# Patient Record
Sex: MALE | Race: BLACK OR AFRICAN AMERICAN | NOT HISPANIC OR LATINO | Employment: OTHER | URBAN - METROPOLITAN AREA
[De-identification: names, ages, dates, MRNs, and addresses within clinical notes are randomized per-mention and may not be internally consistent; named-entity substitution may affect disease eponyms.]

---

## 2018-06-01 ENCOUNTER — OFFICE VISIT (OUTPATIENT)
Dept: URGENT CARE | Facility: CLINIC | Age: 48
End: 2018-06-01
Payer: MEDICAID

## 2018-06-01 VITALS
WEIGHT: 260 LBS | TEMPERATURE: 97 F | BODY MASS INDEX: 35.21 KG/M2 | OXYGEN SATURATION: 97 % | HEART RATE: 89 BPM | HEIGHT: 72 IN | DIASTOLIC BLOOD PRESSURE: 96 MMHG | RESPIRATION RATE: 18 BRPM | SYSTOLIC BLOOD PRESSURE: 150 MMHG

## 2018-06-01 DIAGNOSIS — J32.9 SINUSITIS, UNSPECIFIED CHRONICITY, UNSPECIFIED LOCATION: Primary | ICD-10-CM

## 2018-06-01 DIAGNOSIS — R05.9 COUGH: ICD-10-CM

## 2018-06-01 PROCEDURE — 99203 OFFICE O/P NEW LOW 30 MIN: CPT | Mod: 25,S$GLB,, | Performed by: NURSE PRACTITIONER

## 2018-06-01 RX ORDER — AMLODIPINE BESYLATE 10 MG/1
10 TABLET ORAL DAILY
COMMUNITY

## 2018-06-01 RX ORDER — LORATADINE 10 MG/1
10 TABLET ORAL DAILY
COMMUNITY

## 2018-06-01 RX ORDER — BENAZEPRIL HYDROCHLORIDE 40 MG/1
40 TABLET ORAL DAILY
COMMUNITY

## 2018-06-01 RX ORDER — BENZONATATE 200 MG/1
200 CAPSULE ORAL 3 TIMES DAILY PRN
Qty: 30 CAPSULE | Refills: 0 | Status: SHIPPED | OUTPATIENT
Start: 2018-06-01 | End: 2018-06-11

## 2018-06-01 RX ORDER — DEXAMETHASONE SODIUM PHOSPHATE 100 MG/10ML
10 INJECTION INTRAMUSCULAR; INTRAVENOUS ONCE
Status: COMPLETED | OUTPATIENT
Start: 2018-06-01 | End: 2018-06-01

## 2018-06-01 RX ORDER — AZITHROMYCIN 250 MG/1
TABLET, FILM COATED ORAL
Qty: 6 TABLET | Refills: 0 | Status: SHIPPED | OUTPATIENT
Start: 2018-06-01 | End: 2023-07-06

## 2018-06-01 RX ORDER — FLUTICASONE PROPIONATE 50 MCG
1 SPRAY, SUSPENSION (ML) NASAL DAILY
COMMUNITY

## 2018-06-01 RX ORDER — PROMETHAZINE HYDROCHLORIDE AND DEXTROMETHORPHAN HYDROBROMIDE 6.25; 15 MG/5ML; MG/5ML
5 SYRUP ORAL NIGHTLY PRN
Qty: 50 ML | Refills: 0 | Status: SHIPPED | OUTPATIENT
Start: 2018-06-01 | End: 2018-06-11

## 2018-06-01 RX ADMIN — DEXAMETHASONE SODIUM PHOSPHATE 10 MG: 100 INJECTION INTRAMUSCULAR; INTRAVENOUS at 10:06

## 2018-06-01 NOTE — PROGRESS NOTES
Subjective:       Patient ID: Dharmesh Richter is a 47 y.o. male.    Vitals:  height is 6' (1.829 m) and weight is 117.9 kg (260 lb). His temperature is 97.3 °F (36.3 °C). His blood pressure is 150/96 (abnormal) and his pulse is 89. His respiration is 18 and oxygen saturation is 97%.     Chief Complaint: Sore Throat    Patient presents with complaints of worsening sinus symptoms x 3 weeks. Patient reports chills; however, denies fevers, chest pains, SOB, headaches, or recent illnesses. Patient reports having a sinus balloon surgery over year and half ago. Patient reports taking Claritin and Flonase daily since his surgery. Patient reports taking his daily medications which provided no relief.      Sore Throat    This is a new problem. The current episode started 1 to 4 weeks ago (3 weeks ago). The problem has been gradually worsening. There has been no fever. The pain is at a severity of 8/10. The pain is moderate. Associated symptoms include coughing, ear pain, headaches, a hoarse voice and a plugged ear sensation. Pertinent negatives include no abdominal pain, congestion, ear discharge, shortness of breath, swollen glands or trouble swallowing. He has had no exposure to strep or mono. He has tried nothing for the symptoms.     Review of Systems   Constitution: Positive for malaise/fatigue. Negative for chills and fever.   HENT: Positive for ear pain, hoarse voice and sore throat. Negative for congestion, ear discharge and trouble swallowing.    Eyes: Negative for discharge and redness.   Cardiovascular: Negative for chest pain, dyspnea on exertion and leg swelling.   Respiratory: Positive for cough. Negative for shortness of breath, sputum production and wheezing.    Musculoskeletal: Negative for myalgias.   Gastrointestinal: Negative for abdominal pain and nausea.   Neurological: Positive for headaches.       Objective:      Physical Exam   Constitutional: He is oriented to person, place, and time. He appears  well-developed and well-nourished. He is cooperative.  Non-toxic appearance. He does not appear ill. No distress.   HENT:   Head: Normocephalic and atraumatic.   Right Ear: Hearing, external ear and ear canal normal. No drainage or tenderness. Tympanic membrane is not erythematous. A middle ear effusion (clear) is present.   Left Ear: Hearing, external ear and ear canal normal. No drainage or tenderness. Tympanic membrane is not erythematous. A middle ear effusion (clear) is present.   Nose: Mucosal edema and rhinorrhea present. No nasal deformity. No epistaxis. Right sinus exhibits no maxillary sinus tenderness and no frontal sinus tenderness. Left sinus exhibits no maxillary sinus tenderness and no frontal sinus tenderness.   Mouth/Throat: Uvula is midline and mucous membranes are normal. No trismus in the jaw. Normal dentition. No uvula swelling. Posterior oropharyngeal erythema present. No oropharyngeal exudate, posterior oropharyngeal edema or tonsillar abscesses.   Eyes: Lids are normal. Right eye exhibits no discharge. Left eye exhibits no discharge. Right conjunctiva is injected. Left conjunctiva is injected. No scleral icterus.   Sclera clear bilat   Neck: Trachea normal, normal range of motion, full passive range of motion without pain and phonation normal. Neck supple.   Cardiovascular: Normal rate, regular rhythm, normal heart sounds, intact distal pulses and normal pulses.    Pulmonary/Chest: Effort normal and breath sounds normal. No respiratory distress.   Cough.   Abdominal: Soft. Normal appearance and bowel sounds are normal. He exhibits no distension, no pulsatile midline mass and no mass. There is no tenderness.   Musculoskeletal: Normal range of motion. He exhibits no edema or deformity.   Lymphadenopathy:     He has no cervical adenopathy.   Neurological: He is alert and oriented to person, place, and time. He exhibits normal muscle tone. Coordination normal.   Skin: Skin is warm, dry and  intact. He is not diaphoretic. No pallor.   Psychiatric: He has a normal mood and affect. His speech is normal and behavior is normal. Judgment and thought content normal. Cognition and memory are normal.   Nursing note and vitals reviewed.      Assessment:       1. Sinusitis, unspecified chronicity, unspecified location    2. Cough        Plan:         Sinusitis, unspecified chronicity, unspecified location  -     dexamethasone injection 10 mg; Inject 1 mL (10 mg total) into the muscle once.  -     azithromycin (Z-MICH) 250 MG tablet; Take 2 tablets by mouth on day 1; Take 1 tablet by mouth on days 2-5  Dispense: 6 tablet; Refill: 0  -     benzonatate (TESSALON) 200 MG capsule; Take 1 capsule (200 mg total) by mouth 3 (three) times daily as needed.  Dispense: 30 capsule; Refill: 0    Cough  -     promethazine-dextromethorphan (PROMETHAZINE-DM) 6.25-15 mg/5 mL Syrp; Take 5 mLs by mouth nightly as needed.  Dispense: 50 mL; Refill: 0      Patient Instructions     Use an antihistmine such as Claritin, Zyrtec or Allegra to dry you out.     Use pseudoephedrine (behind the counter) to decongest. Pseudoephedrine  30 mg up to 240 mg /day. It can raise your blood pressure and give you palpitations.    Use mucinex (guaifenisin) to break up mucous up to 2400mg/day to loosen any mucous. The mucinex DM pill has a cough suppressant that can be used at night to stop the tickle at the back of your throat.    Use Nasal Saline to mechanically move any post nasal drip from your eustachian tube or from the back of your throat.    Use Afrin in each nare for no longer than 3 days, as it is addictive. It can also dry out your mucous membranes and cause elevated blood pressure.    Use Flonase 1-2 sprays/nostril per day. It is a local acting steroid nasal spray, if you develop a bloody nose, stop using the medication immediately.    Use warm salt water gargles to ease your throat pain. Warm salt water gargles as needed for sore throat-  1/2  tsp salt to 1 cup warm water, gargle as desired.    Sometimes Nyquil at night is beneficial to help you get some rest, however it is sedating and it does have an antihistamine, and tylenol.  Follow-up with PCP, ENT, or return to Urgent Care for worsening of symptoms.  Sinusitis (Antibiotic Treatment)    The sinuses are air-filled spaces within the bones of the face. They connect to the inside of the nose. Sinusitis is an inflammation of the tissue lining the sinus cavity. Sinus inflammation can occur during a cold. It can also be due to allergies to pollens and other particles in the air. Sinusitis can cause symptoms of sinus congestion and fullness. A sinus infection causes fever, headache and facial pain. There is often green or yellow drainage from the nose or into the back of the throat (post-nasal drip). You have been given antibiotics to treat this condition.  Home care:  · Take the full course of antibiotics as instructed. Do not stop taking them, even if you feel better.  · Drink plenty of water, hot tea, and other liquids. This may help thin mucus. It also may promote sinus drainage.  · Heat may help soothe painful areas of the face. Use a towel soaked in hot water. Or,  the shower and direct the hot spray onto your face. Using a vaporizer along with a menthol rub at night may also help.   · An expectorant containing guaifenesin may help thin the mucus and promote drainage from the sinuses.  · Over-the-counter decongestants may be used unless a similar medicine was prescribed. Nasal sprays work the fastest. Use one that contains phenylephrine or oxymetazoline. First blow the nose gently. Then use the spray. Do not use these medicines more often than directed on the label or symptoms may get worse. You may also use tablets containing pseudoephedrine. Avoid products that combine ingredients, because side effects may be increased. Read labels. You can also ask the pharmacist for help. (NOTE: Persons  with high blood pressure should not use decongestants. They can raise blood pressure.)  · Over-the-counter antihistamines may help if allergies contributed to your sinusitis.    · Do not use nasal rinses or irrigation during an acute sinus infection, unless told to by your health care provider. Rinsing may spread the infection to other sinuses.  · Use acetaminophen or ibuprofen to control pain, unless another pain medicine was prescribed. (If you have chronic liver or kidney disease or ever had a stomach ulcer, talk with your doctor before using these medicines. Aspirin should never be used in anyone under 18 years of age who is ill with a fever. It may cause severe liver damage.)  · Don't smoke. This can worsen symptoms.  Follow-up care  Follow up with your healthcare provider or our staff if you are not improving within the next week.  When to seek medical advice  Call your healthcare provider if any of these occur:  · Facial pain or headache becoming more severe  · Stiff neck  · Unusual drowsiness or confusion  · Swelling of the forehead or eyelids  · Vision problems, including blurred or double vision  · Fever of 100.4ºF (38ºC) or higher, or as directed by your healthcare provider  · Seizure  · Breathing problems  · Symptoms not resolving within 10 days  Date Last Reviewed: 4/13/2015  © 4412-6071 [employer]. [address] [place] [address], [place] [address]. All rights reserved. This information is not intended as a substitute for professional medical care. Always follow your healthcare professional's instructions.      Please return here or go to the Emergency Department for any concerns or worsening of condition.  If you were prescribed antibiotics, please take them to completion.  If you were prescribed a narcotic medication, do not drive or operate heavy equipment or machinery while taking these medications.  Please follow up with your primary care doctor or specialist as needed.    If you  smoke,  please stop smoking.

## 2018-06-01 NOTE — PATIENT INSTRUCTIONS
Use an antihistmine such as Claritin, Zyrtec or Allegra to dry you out.     Use pseudoephedrine (behind the counter) to decongest. Pseudoephedrine  30 mg up to 240 mg /day. It can raise your blood pressure and give you palpitations.    Use mucinex (guaifenisin) to break up mucous up to 2400mg/day to loosen any mucous. The mucinex DM pill has a cough suppressant that can be used at night to stop the tickle at the back of your throat.    Use Nasal Saline to mechanically move any post nasal drip from your eustachian tube or from the back of your throat.    Use Afrin in each nare for no longer than 3 days, as it is addictive. It can also dry out your mucous membranes and cause elevated blood pressure.    Use Flonase 1-2 sprays/nostril per day. It is a local acting steroid nasal spray, if you develop a bloody nose, stop using the medication immediately.    Use warm salt water gargles to ease your throat pain. Warm salt water gargles as needed for sore throat-  1/2 tsp salt to 1 cup warm water, gargle as desired.    Sometimes Nyquil at night is beneficial to help you get some rest, however it is sedating and it does have an antihistamine, and tylenol.  Follow-up with PCP, ENT, or return to Urgent Care for worsening of symptoms.  Sinusitis (Antibiotic Treatment)    The sinuses are air-filled spaces within the bones of the face. They connect to the inside of the nose. Sinusitis is an inflammation of the tissue lining the sinus cavity. Sinus inflammation can occur during a cold. It can also be due to allergies to pollens and other particles in the air. Sinusitis can cause symptoms of sinus congestion and fullness. A sinus infection causes fever, headache and facial pain. There is often green or yellow drainage from the nose or into the back of the throat (post-nasal drip). You have been given antibiotics to treat this condition.  Home care:  · Take the full course of antibiotics as instructed. Do not stop taking them, even  if you feel better.  · Drink plenty of water, hot tea, and other liquids. This may help thin mucus. It also may promote sinus drainage.  · Heat may help soothe painful areas of the face. Use a towel soaked in hot water. Or,  the shower and direct the hot spray onto your face. Using a vaporizer along with a menthol rub at night may also help.   · An expectorant containing guaifenesin may help thin the mucus and promote drainage from the sinuses.  · Over-the-counter decongestants may be used unless a similar medicine was prescribed. Nasal sprays work the fastest. Use one that contains phenylephrine or oxymetazoline. First blow the nose gently. Then use the spray. Do not use these medicines more often than directed on the label or symptoms may get worse. You may also use tablets containing pseudoephedrine. Avoid products that combine ingredients, because side effects may be increased. Read labels. You can also ask the pharmacist for help. (NOTE: Persons with high blood pressure should not use decongestants. They can raise blood pressure.)  · Over-the-counter antihistamines may help if allergies contributed to your sinusitis.    · Do not use nasal rinses or irrigation during an acute sinus infection, unless told to by your health care provider. Rinsing may spread the infection to other sinuses.  · Use acetaminophen or ibuprofen to control pain, unless another pain medicine was prescribed. (If you have chronic liver or kidney disease or ever had a stomach ulcer, talk with your doctor before using these medicines. Aspirin should never be used in anyone under 18 years of age who is ill with a fever. It may cause severe liver damage.)  · Don't smoke. This can worsen symptoms.  Follow-up care  Follow up with your healthcare provider or our staff if you are not improving within the next week.  When to seek medical advice  Call your healthcare provider if any of these occur:  · Facial pain or headache becoming more  severe  · Stiff neck  · Unusual drowsiness or confusion  · Swelling of the forehead or eyelids  · Vision problems, including blurred or double vision  · Fever of 100.4ºF (38ºC) or higher, or as directed by your healthcare provider  · Seizure  · Breathing problems  · Symptoms not resolving within 10 days  Date Last Reviewed: 4/13/2015  © 1639-5029 [employer]. [address] [place] [address], [place] [address]. All rights reserved. This information is not intended as a substitute for professional medical care. Always follow your healthcare professional's instructions.      Please return here or go to the Emergency Department for any concerns or worsening of condition.  If you were prescribed antibiotics, please take them to completion.  If you were prescribed a narcotic medication, do not drive or operate heavy equipment or machinery while taking these medications.  Please follow up with your primary care doctor or specialist as needed.    If you  smoke, please stop smoking.

## 2018-06-04 ENCOUNTER — TELEPHONE (OUTPATIENT)
Dept: URGENT CARE | Facility: CLINIC | Age: 48
End: 2018-06-04

## 2021-08-21 ENCOUNTER — CLINICAL SUPPORT (OUTPATIENT)
Dept: URGENT CARE | Facility: CLINIC | Age: 51
End: 2021-08-21
Payer: MEDICAID

## 2021-08-21 DIAGNOSIS — Z20.822 CLOSE EXPOSURE TO COVID-19 VIRUS: Primary | ICD-10-CM

## 2021-08-21 LAB
CTP QC/QA: YES
SARS-COV-2 RDRP RESP QL NAA+PROBE: NEGATIVE

## 2021-08-21 PROCEDURE — U0002 COVID-19 LAB TEST NON-CDC: HCPCS | Mod: QW,S$GLB,, | Performed by: INTERNAL MEDICINE

## 2021-08-21 PROCEDURE — U0002: ICD-10-PCS | Mod: QW,S$GLB,, | Performed by: INTERNAL MEDICINE

## 2022-09-20 ENCOUNTER — OFFICE VISIT (OUTPATIENT)
Dept: URGENT CARE | Facility: CLINIC | Age: 52
End: 2022-09-20
Payer: MEDICAID

## 2022-09-20 VITALS
OXYGEN SATURATION: 95 % | HEIGHT: 72 IN | BODY MASS INDEX: 35.21 KG/M2 | DIASTOLIC BLOOD PRESSURE: 107 MMHG | WEIGHT: 260 LBS | TEMPERATURE: 98 F | HEART RATE: 65 BPM | SYSTOLIC BLOOD PRESSURE: 163 MMHG | RESPIRATION RATE: 18 BRPM

## 2022-09-20 DIAGNOSIS — L03.115 CELLULITIS OF RIGHT FOOT: Primary | ICD-10-CM

## 2022-09-20 PROCEDURE — 3077F PR MOST RECENT SYSTOLIC BLOOD PRESSURE >= 140 MM HG: ICD-10-PCS | Mod: CPTII,S$GLB,, | Performed by: PHYSICIAN ASSISTANT

## 2022-09-20 PROCEDURE — 4010F PR ACE/ARB THEARPY RXD/TAKEN: ICD-10-PCS | Mod: CPTII,S$GLB,, | Performed by: PHYSICIAN ASSISTANT

## 2022-09-20 PROCEDURE — 4010F ACE/ARB THERAPY RXD/TAKEN: CPT | Mod: CPTII,S$GLB,, | Performed by: PHYSICIAN ASSISTANT

## 2022-09-20 PROCEDURE — 1160F PR REVIEW ALL MEDS BY PRESCRIBER/CLIN PHARMACIST DOCUMENTED: ICD-10-PCS | Mod: CPTII,S$GLB,, | Performed by: PHYSICIAN ASSISTANT

## 2022-09-20 PROCEDURE — 3080F PR MOST RECENT DIASTOLIC BLOOD PRESSURE >= 90 MM HG: ICD-10-PCS | Mod: CPTII,S$GLB,, | Performed by: PHYSICIAN ASSISTANT

## 2022-09-20 PROCEDURE — 99203 OFFICE O/P NEW LOW 30 MIN: CPT | Mod: S$GLB,,, | Performed by: PHYSICIAN ASSISTANT

## 2022-09-20 PROCEDURE — 1159F PR MEDICATION LIST DOCUMENTED IN MEDICAL RECORD: ICD-10-PCS | Mod: CPTII,S$GLB,, | Performed by: PHYSICIAN ASSISTANT

## 2022-09-20 PROCEDURE — 1159F MED LIST DOCD IN RCRD: CPT | Mod: CPTII,S$GLB,, | Performed by: PHYSICIAN ASSISTANT

## 2022-09-20 PROCEDURE — 3008F PR BODY MASS INDEX (BMI) DOCUMENTED: ICD-10-PCS | Mod: CPTII,S$GLB,, | Performed by: PHYSICIAN ASSISTANT

## 2022-09-20 PROCEDURE — 3008F BODY MASS INDEX DOCD: CPT | Mod: CPTII,S$GLB,, | Performed by: PHYSICIAN ASSISTANT

## 2022-09-20 PROCEDURE — 3080F DIAST BP >= 90 MM HG: CPT | Mod: CPTII,S$GLB,, | Performed by: PHYSICIAN ASSISTANT

## 2022-09-20 PROCEDURE — 3077F SYST BP >= 140 MM HG: CPT | Mod: CPTII,S$GLB,, | Performed by: PHYSICIAN ASSISTANT

## 2022-09-20 PROCEDURE — 1160F RVW MEDS BY RX/DR IN RCRD: CPT | Mod: CPTII,S$GLB,, | Performed by: PHYSICIAN ASSISTANT

## 2022-09-20 PROCEDURE — 99203 PR OFFICE/OUTPT VISIT, NEW, LEVL III, 30-44 MIN: ICD-10-PCS | Mod: S$GLB,,, | Performed by: PHYSICIAN ASSISTANT

## 2022-09-20 RX ORDER — CLINDAMYCIN HYDROCHLORIDE 300 MG/1
300 CAPSULE ORAL EVERY 6 HOURS
Qty: 28 CAPSULE | Refills: 0 | Status: SHIPPED | OUTPATIENT
Start: 2022-09-20 | End: 2022-09-27

## 2022-09-20 RX ORDER — KETOROLAC TROMETHAMINE 30 MG/ML
30 INJECTION, SOLUTION INTRAMUSCULAR; INTRAVENOUS
Status: COMPLETED | OUTPATIENT
Start: 2022-09-20 | End: 2022-09-20

## 2022-09-20 RX ORDER — MUPIROCIN 20 MG/G
OINTMENT TOPICAL 2 TIMES DAILY
Qty: 15 G | Refills: 0 | Status: SHIPPED | OUTPATIENT
Start: 2022-09-20

## 2022-09-20 RX ADMIN — KETOROLAC TROMETHAMINE 30 MG: 30 INJECTION, SOLUTION INTRAMUSCULAR; INTRAVENOUS at 06:09

## 2022-09-20 NOTE — PROGRESS NOTES
Subjective:       Patient ID: Dharmesh Richter is a 52 y.o. male.    Vitals:  height is 6' (1.829 m) and weight is 117.9 kg (260 lb). His oral temperature is 98.3 °F (36.8 °C). His blood pressure is 163/107 (abnormal) and his pulse is 65. His respiration is 18 and oxygen saturation is 95%.     Chief Complaint: Foot Injury    Mr. Richter presents for evaluation of right foot swelling, redness, pain that started this morning.  He reports pain that is worse with weight-bearing.  The foot is hot and red.  He denies any fevers or chills.  He denies any drainage from the rash.  He reports this exact same thing happened about 10 or 15 years ago and he had cellulitis that was treated with antibiotics and it resolved.  He has not tried anything for his symptoms.    Foot Injury   The incident occurred 12 to 24 hours ago. There was no injury mechanism. The pain is present in the left foot. The pain is at a severity of 10/10. The pain is severe. The pain has been Constant since onset. He reports no foreign bodies present. He has tried nothing for the symptoms.     Constitution: Negative for chills, sweating, fatigue and fever.   HENT:  Negative for ear pain, ear discharge, congestion, postnasal drip, sinus pain, sinus pressure and sore throat.    Neck: Negative for neck pain.   Cardiovascular:  Negative for chest trauma, chest pain, leg swelling, palpitations, sob on exertion and passing out.   Eyes:  Negative for blurred vision.   Respiratory:  Negative for cough and shortness of breath.    Gastrointestinal:  Negative for abdominal pain, nausea, vomiting and diarrhea.   Genitourinary:  Negative for dysuria, frequency and urgency.   Musculoskeletal:  Positive for pain and pain with walking. Negative for trauma.   Skin:  Positive for erythema. Negative for rash and hives.   Allergic/Immunologic: Negative for hives and itching.   Neurological:  Negative for dizziness, history of vertigo, light-headedness, passing out, facial  drooping, speech difficulty, coordination disturbances, loss of balance, headaches and altered mental status.   Hematologic/Lymphatic: Negative for easy bruising/bleeding. Does not bruise/bleed easily.   Psychiatric/Behavioral:  Negative for altered mental status.      Objective:      Physical Exam   Constitutional: He is oriented to person, place, and time. He appears well-developed.   HENT:   Head: Normocephalic and atraumatic. Head is without abrasion, without contusion and without laceration.   Ears:   Right Ear: External ear normal.   Left Ear: External ear normal.   Nose: Nose normal.   Mouth/Throat: Oropharynx is clear and moist and mucous membranes are normal.   Eyes: Conjunctivae, EOM and lids are normal. Pupils are equal, round, and reactive to light.   Neck: Trachea normal and phonation normal. Neck supple.   Cardiovascular: Normal rate, regular rhythm and normal heart sounds.   Pulmonary/Chest: Effort normal and breath sounds normal. No stridor. No respiratory distress.   Musculoskeletal: Normal range of motion.         General: Normal range of motion.        Feet:    Neurological: He is alert and oriented to person, place, and time.   Skin: Skin is warm, dry, intact and no rash. Capillary refill takes less than 2 seconds. erythema No abrasion, No burn, No bruising and No ecchymosis   Psychiatric: His speech is normal and behavior is normal. Judgment and thought content normal.   Nursing note and vitals reviewed.              Assessment:       1. Cellulitis of right foot          Plan:         Cellulitis of right foot    Other orders  -     mupirocin (BACTROBAN) 2 % ointment; Apply topically 2 (two) times daily.  Dispense: 15 g; Refill: 0  -     clindamycin (CLEOCIN) 300 MG capsule; Take 1 capsule (300 mg total) by mouth every 6 (six) hours. for 7 days  Dispense: 28 capsule; Refill: 0  -     ketorolac injection 30 mg    Diagnoses and plan discussed with the patient, as well as the expected course and  duration of his symptoms.  All questions and concerns were addressed prior to discharge.  He was advised to follow up with his PCP within 1 week if symptoms do not improve.  Emergency department precautions were given.  Patient verbalized understanding and was happy with the plan of care.   Note dictated with voice recognition software, please excuse any grammatical errors.  I note the patient has elevated blood pressures during this encounter. Patient does not have signs or symptoms suggestive of hypertensive emergency (denies chest pain, shortness breath, vision change, or urinary changes consistent with acute hypertensive kidney disease). Risk of acutely lowering blood pressure exceeds benefit. I have asked the patient follow up with PCP for continued hypertension management.    Patient Instructions   PLEASE READ YOUR DISCHARGE INSTRUCTIONS ENTIRELY AS IT CONTAINS IMPORTANT INFORMATION.  You received an injection of a powerful NSAID today (Toradol).  Its effects will last up to 24 hours.  Please do not take another NSAID (ie aspirin, ibuprofen, Aleve, Advil or Motrin) until this time tomorrow.  If you continue to have pain, you may take Tylenol (acetaminophen) if you are not allergic to this medication.  Please take care 1st dose of antibiotics tonight.  - Rest.    - Drink plenty of fluids.    - Tylenol or Ibuprofen as directed as needed for fever/pain.    - If you were prescribed antibiotics, please take them to completion.  - If you are female and on birth control pills - please use additional methods of contraception to prevent pregnancy while on antibiotics and for one cycle after.   - If you were prescribed a narcotic medication, a cough syrup, or a muscle relaxer, do not drive or operate heavy equipment or machinery while taking these medications, as they can cause drowsiness.   - If a referral to a specialty was made today, you should receive a phone call in the next few days to schedule an appt.  Please  call [phone] to schedule an appt if have not gotten a phone call in the next few days.  - If you smoke, please stop smoking.  -You must understand that you've received an Urgent Care treatment only and that you may be released before all your medical problems are known or treated. You, the patient, will arrange for follow up care as instructed. Please arrange follow up with your primary medical clinic as soon as possible.   - Follow up with your PCP or specialty clinic as directed in the next 1-2 weeks if not improved or as needed.  You can call [phone] to schedule an appointment with the appropriate provider.    - Please return to Urgent Care or to the Emergency Department if your symptoms worsen.    Patient aware and verbalized understanding.

## 2022-09-20 NOTE — LETTER
September 20, 2022      [facility]  [address] [address]  Phone: [phone]  Fax: [phone]       Patient: Dharmesh Richter   YOB: 1970  Date of Visit: 09/20/2022    To Whom It May Concern:    Wild Richter  was at [facility] on 09/20/2022. The patient may return to work/school on 9/24/2022 with no restrictions. If you have any questions or concerns, or if I can be of further assistance, please do not hesitate to contact me.    Sincerely,    Natalia Horner PA-C

## 2022-09-20 NOTE — PATIENT INSTRUCTIONS
PLEASE READ YOUR DISCHARGE INSTRUCTIONS ENTIRELY AS IT CONTAINS IMPORTANT INFORMATION.  You received an injection of a powerful NSAID today (Toradol).  Its effects will last up to 24 hours.  Please do not take another NSAID (ie aspirin, ibuprofen, Aleve, Advil or Motrin) until this time tomorrow.  If you continue to have pain, you may take Tylenol (acetaminophen) if you are not allergic to this medication.  Please take care 1st dose of antibiotics tonight.  - Rest.    - Drink plenty of fluids.    - Tylenol or Ibuprofen as directed as needed for fever/pain.    - If you were prescribed antibiotics, please take them to completion.  - If you are female and on birth control pills - please use additional methods of contraception to prevent pregnancy while on antibiotics and for one cycle after.   - If you were prescribed a narcotic medication, a cough syrup, or a muscle relaxer, do not drive or operate heavy equipment or machinery while taking these medications, as they can cause drowsiness.   - If a referral to a specialty was made today, you should receive a phone call in the next few days to schedule an appt.  Please call [phone] to schedule an appt if have not gotten a phone call in the next few days.  - If you smoke, please stop smoking.  -You must understand that you've received an Urgent Care treatment only and that you may be released before all your medical problems are known or treated. You, the patient, will arrange for follow up care as instructed. Please arrange follow up with your primary medical clinic as soon as possible.   - Follow up with your PCP or specialty clinic as directed in the next 1-2 weeks if not improved or as needed.  You can call [phone] to schedule an appointment with the appropriate provider.    - Please return to Urgent Care or to the Emergency Department if your symptoms worsen.    Patient aware and verbalized understanding.

## 2023-07-06 ENCOUNTER — LAB VISIT (OUTPATIENT)
Dept: LAB | Facility: HOSPITAL | Age: 53
End: 2023-07-06
Attending: NURSE PRACTITIONER
Payer: COMMERCIAL

## 2023-07-06 ENCOUNTER — OFFICE VISIT (OUTPATIENT)
Dept: PRIMARY CARE CLINIC | Facility: CLINIC | Age: 53
End: 2023-07-06
Payer: COMMERCIAL

## 2023-07-06 VITALS
OXYGEN SATURATION: 95 % | SYSTOLIC BLOOD PRESSURE: 124 MMHG | BODY MASS INDEX: 37.42 KG/M2 | WEIGHT: 276.25 LBS | HEART RATE: 68 BPM | HEIGHT: 72 IN | DIASTOLIC BLOOD PRESSURE: 78 MMHG

## 2023-07-06 DIAGNOSIS — Z00.00 ROUTINE MEDICAL EXAM: ICD-10-CM

## 2023-07-06 DIAGNOSIS — Z12.5 PROSTATE CANCER SCREENING: ICD-10-CM

## 2023-07-06 DIAGNOSIS — Z00.00 ROUTINE MEDICAL EXAM: Primary | ICD-10-CM

## 2023-07-06 DIAGNOSIS — R53.83 FATIGUE, UNSPECIFIED TYPE: Primary | ICD-10-CM

## 2023-07-06 LAB
ALBUMIN SERPL BCP-MCNC: 3.9 G/DL (ref 3.5–5.2)
ALP SERPL-CCNC: 81 U/L (ref 55–135)
ALT SERPL W/O P-5'-P-CCNC: 32 U/L (ref 10–44)
ANION GAP SERPL CALC-SCNC: 11 MMOL/L (ref 8–16)
AST SERPL-CCNC: 21 U/L (ref 10–40)
BASOPHILS # BLD AUTO: 0.04 K/UL (ref 0–0.2)
BASOPHILS NFR BLD: 0.5 % (ref 0–1.9)
BILIRUB SERPL-MCNC: 0.3 MG/DL (ref 0.1–1)
BUN SERPL-MCNC: 12 MG/DL (ref 6–20)
CALCIUM SERPL-MCNC: 9.5 MG/DL (ref 8.7–10.5)
CHLORIDE SERPL-SCNC: 104 MMOL/L (ref 95–110)
CHOLEST SERPL-MCNC: 234 MG/DL (ref 120–199)
CHOLEST/HDLC SERPL: 4.3 {RATIO} (ref 2–5)
CO2 SERPL-SCNC: 23 MMOL/L (ref 23–29)
COMPLEXED PSA SERPL-MCNC: 0.44 NG/ML (ref 0–4)
CREAT SERPL-MCNC: 0.8 MG/DL (ref 0.5–1.4)
DIFFERENTIAL METHOD: ABNORMAL
EOSINOPHIL # BLD AUTO: 0.1 K/UL (ref 0–0.5)
EOSINOPHIL NFR BLD: 1.4 % (ref 0–8)
ERYTHROCYTE [DISTWIDTH] IN BLOOD BY AUTOMATED COUNT: 13.1 % (ref 11.5–14.5)
EST. GFR  (NO RACE VARIABLE): >60 ML/MIN/1.73 M^2
ESTIMATED AVG GLUCOSE: 105 MG/DL (ref 68–131)
GLUCOSE SERPL-MCNC: 84 MG/DL (ref 70–110)
HBA1C MFR BLD: 5.3 % (ref 4–5.6)
HCT VFR BLD AUTO: 46.6 % (ref 40–54)
HDLC SERPL-MCNC: 54 MG/DL (ref 40–75)
HDLC SERPL: 23.1 % (ref 20–50)
HGB BLD-MCNC: 15.7 G/DL (ref 14–18)
IMM GRANULOCYTES # BLD AUTO: 0.03 K/UL (ref 0–0.04)
IMM GRANULOCYTES NFR BLD AUTO: 0.4 % (ref 0–0.5)
LDLC SERPL CALC-MCNC: 155.4 MG/DL (ref 63–159)
LYMPHOCYTES # BLD AUTO: 1.5 K/UL (ref 1–4.8)
LYMPHOCYTES NFR BLD: 18 % (ref 18–48)
MCH RBC QN AUTO: 31.5 PG (ref 27–31)
MCHC RBC AUTO-ENTMCNC: 33.7 G/DL (ref 32–36)
MCV RBC AUTO: 94 FL (ref 82–98)
MONOCYTES # BLD AUTO: 0.5 K/UL (ref 0.3–1)
MONOCYTES NFR BLD: 5.8 % (ref 4–15)
NEUTROPHILS # BLD AUTO: 6.3 K/UL (ref 1.8–7.7)
NEUTROPHILS NFR BLD: 73.9 % (ref 38–73)
NONHDLC SERPL-MCNC: 180 MG/DL
NRBC BLD-RTO: 0 /100 WBC
PLATELET # BLD AUTO: 283 K/UL (ref 150–450)
PMV BLD AUTO: 10.7 FL (ref 9.2–12.9)
POTASSIUM SERPL-SCNC: 4.1 MMOL/L (ref 3.5–5.1)
PROT SERPL-MCNC: 7.6 G/DL (ref 6–8.4)
RBC # BLD AUTO: 4.98 M/UL (ref 4.6–6.2)
SODIUM SERPL-SCNC: 138 MMOL/L (ref 136–145)
T4 FREE SERPL-MCNC: 0.86 NG/DL (ref 0.71–1.51)
TRIGL SERPL-MCNC: 123 MG/DL (ref 30–150)
TSH SERPL DL<=0.005 MIU/L-ACNC: 1.32 UIU/ML (ref 0.4–4)
WBC # BLD AUTO: 8.48 K/UL (ref 3.9–12.7)

## 2023-07-06 PROCEDURE — 3078F PR MOST RECENT DIASTOLIC BLOOD PRESSURE < 80 MM HG: ICD-10-PCS | Mod: CPTII,S$GLB,, | Performed by: NURSE PRACTITIONER

## 2023-07-06 PROCEDURE — 3074F SYST BP LT 130 MM HG: CPT | Mod: CPTII,S$GLB,, | Performed by: NURSE PRACTITIONER

## 2023-07-06 PROCEDURE — 4010F ACE/ARB THERAPY RXD/TAKEN: CPT | Mod: CPTII,S$GLB,, | Performed by: NURSE PRACTITIONER

## 2023-07-06 PROCEDURE — 99396 PREV VISIT EST AGE 40-64: CPT | Mod: S$GLB,,, | Performed by: NURSE PRACTITIONER

## 2023-07-06 PROCEDURE — 1159F PR MEDICATION LIST DOCUMENTED IN MEDICAL RECORD: ICD-10-PCS | Mod: CPTII,S$GLB,, | Performed by: NURSE PRACTITIONER

## 2023-07-06 PROCEDURE — 3074F PR MOST RECENT SYSTOLIC BLOOD PRESSURE < 130 MM HG: ICD-10-PCS | Mod: CPTII,S$GLB,, | Performed by: NURSE PRACTITIONER

## 2023-07-06 PROCEDURE — 3008F PR BODY MASS INDEX (BMI) DOCUMENTED: ICD-10-PCS | Mod: CPTII,S$GLB,, | Performed by: NURSE PRACTITIONER

## 2023-07-06 PROCEDURE — 36415 COLL VENOUS BLD VENIPUNCTURE: CPT | Mod: PN | Performed by: NURSE PRACTITIONER

## 2023-07-06 PROCEDURE — 1159F MED LIST DOCD IN RCRD: CPT | Mod: CPTII,S$GLB,, | Performed by: NURSE PRACTITIONER

## 2023-07-06 PROCEDURE — 1160F RVW MEDS BY RX/DR IN RCRD: CPT | Mod: CPTII,S$GLB,, | Performed by: NURSE PRACTITIONER

## 2023-07-06 PROCEDURE — 83036 HEMOGLOBIN GLYCOSYLATED A1C: CPT | Performed by: NURSE PRACTITIONER

## 2023-07-06 PROCEDURE — 3008F BODY MASS INDEX DOCD: CPT | Mod: CPTII,S$GLB,, | Performed by: NURSE PRACTITIONER

## 2023-07-06 PROCEDURE — 3078F DIAST BP <80 MM HG: CPT | Mod: CPTII,S$GLB,, | Performed by: NURSE PRACTITIONER

## 2023-07-06 PROCEDURE — 84153 ASSAY OF PSA TOTAL: CPT | Performed by: NURSE PRACTITIONER

## 2023-07-06 PROCEDURE — 80061 LIPID PANEL: CPT | Performed by: NURSE PRACTITIONER

## 2023-07-06 PROCEDURE — 99999 PR PBB SHADOW E&M-EST. PATIENT-LVL IV: CPT | Mod: PBBFAC,,, | Performed by: NURSE PRACTITIONER

## 2023-07-06 PROCEDURE — 99396 PR PREVENTIVE VISIT,EST,40-64: ICD-10-PCS | Mod: S$GLB,,, | Performed by: NURSE PRACTITIONER

## 2023-07-06 PROCEDURE — 99999 PR PBB SHADOW E&M-EST. PATIENT-LVL IV: ICD-10-PCS | Mod: PBBFAC,,, | Performed by: NURSE PRACTITIONER

## 2023-07-06 PROCEDURE — 80053 COMPREHEN METABOLIC PANEL: CPT | Performed by: NURSE PRACTITIONER

## 2023-07-06 PROCEDURE — 85025 COMPLETE CBC W/AUTO DIFF WBC: CPT | Performed by: NURSE PRACTITIONER

## 2023-07-06 PROCEDURE — 84439 ASSAY OF FREE THYROXINE: CPT | Performed by: NURSE PRACTITIONER

## 2023-07-06 PROCEDURE — 1160F PR REVIEW ALL MEDS BY PRESCRIBER/CLIN PHARMACIST DOCUMENTED: ICD-10-PCS | Mod: CPTII,S$GLB,, | Performed by: NURSE PRACTITIONER

## 2023-07-06 PROCEDURE — 84443 ASSAY THYROID STIM HORMONE: CPT | Performed by: NURSE PRACTITIONER

## 2023-07-06 PROCEDURE — 4010F PR ACE/ARB THEARPY RXD/TAKEN: ICD-10-PCS | Mod: CPTII,S$GLB,, | Performed by: NURSE PRACTITIONER

## 2023-07-06 NOTE — PROGRESS NOTES
[facility] Note    Chief Complaint      Chief Complaint   Patient presents with    Annual Exam       History of Present Illness      Dharmesh Richter is a 53 y.o. male who presents today for   Chief Complaint   Patient presents with    Annual Exam         Patient is new to me and an established patient with [facility]. He presents to clinic today for his annual exam. He is currently under care with Dr. Sameer Ni for gastrology issues. He states his colonoscopy was done yesterday. Results have yet to be loaded and viewed in Epic system. He will continue f/u care with Dr. Ni. He exercises daily by walking.       Review of Systems   All 12 systems otherwise negative.     Family History:  family history is not on file.   Family history was reviewed with patient.     Medications:  Outpatient Encounter Medications as of 7/6/2023   Medication Sig Dispense Refill    amLODIPine (NORVASC) 10 MG tablet Take 10 mg by mouth once daily.      benazepril (LOTENSIN) 40 MG tablet Take 40 mg by mouth once daily.      fluticasone (FLONASE) 50 mcg/actuation nasal spray 1 spray by Each Nare route once daily.      loratadine (CLARITIN) 10 mg tablet Take 10 mg by mouth once daily.      mupirocin (BACTROBAN) 2 % ointment Apply topically 2 (two) times daily. (Patient not taking: Reported on 7/6/2023) 15 g 0    [DISCONTINUED] azithromycin (Z-MICH) 250 MG tablet Take 2 tablets by mouth on day 1; Take 1 tablet by mouth on days 2-5 (Patient not taking: Reported on 9/20/2022) 6 tablet 0     No facility-administered encounter medications on file as of 7/6/2023.       Allergies:  Review of patient's allergies indicates:  No Known Allergies    Health Maintenance:  Health Maintenance   Topic Date Due    Hepatitis C Screening  Never done    Lipid Panel  Never done    TETANUS VACCINE  Never done     Health Maintenance Topics with due status: Not Due       Topic Last Completion Date    Influenza Vaccine Not Due       Physical  Exam      Vital Signs  Pulse: 68  SpO2: 95 %  BP: 124/78  BP Location: Right arm  Patient Position: Sitting  Pain Score: 0-No pain  Height and Weight  Height: 6' (182.9 cm)  Weight: 125.3 kg (276 lb 3.8 oz)  BSA (Calculated - sq m): 2.52 sq meters  BMI (Calculated): 37.5  Weight in (lb) to have BMI = 25: 183.9]    Physical Exam  Vitals reviewed.   Constitutional:       Appearance: Normal appearance. He is normal weight.   HENT:      Head: Normocephalic and atraumatic.      Nose: Nose normal.      Mouth/Throat:      Mouth: Mucous membranes are moist.      Pharynx: Oropharynx is clear.   Eyes:      Extraocular Movements: Extraocular movements intact.      Conjunctiva/sclera: Conjunctivae normal.      Pupils: Pupils are equal, round, and reactive to light.   Cardiovascular:      Rate and Rhythm: Normal rate and regular rhythm.      Pulses: Normal pulses.      Heart sounds: Normal heart sounds.   Pulmonary:      Effort: Pulmonary effort is normal.      Breath sounds: Normal breath sounds.   Musculoskeletal:         General: Normal range of motion.      Cervical back: Normal range of motion and neck supple.   Skin:     General: Skin is warm and dry.      Capillary Refill: Capillary refill takes less than 2 seconds.   Neurological:      General: No focal deficit present.      Mental Status: He is alert and oriented to person, place, and time. Mental status is at baseline.   Psychiatric:         Mood and Affect: Mood normal.         Behavior: Behavior normal.         Thought Content: Thought content normal.         Judgment: Judgment normal.          Assessment/Plan     Dharmesh Richter is a 53 y.o.male with:    Routine medical exam  -     CBC Auto Differential; Future; Expected date: 07/06/2023  -     Comprehensive Metabolic Panel; Future; Expected date: 07/06/2023  -     Hemoglobin A1C; Future; Expected date: 07/06/2023  -     Lipid Panel; Future; Expected date: 07/06/2023  -     T4, Free; Future; Expected date:  07/06/2023  -     TSH; Future; Expected date: 07/06/2023    Prostate cancer screening  -     PSA, Screening; Future; Expected date: 07/06/2023        As above, continue current medications and maintain follow up with specialists.  Return to clinic as needed.    Greater than 50% of visit was spent face to face with patient.  All questions were answered to patient's satisfaction.            Karen L Spencer, NP-C [facility]

## 2023-07-10 ENCOUNTER — TELEPHONE (OUTPATIENT)
Dept: PRIMARY CARE CLINIC | Facility: CLINIC | Age: 53
End: 2023-07-10
Payer: COMMERCIAL

## 2023-07-10 DIAGNOSIS — E78.00 HYPERCHOLESTEREMIA: Primary | ICD-10-CM

## 2023-07-10 DIAGNOSIS — R53.83 FATIGUE, UNSPECIFIED TYPE: Primary | ICD-10-CM

## 2023-07-10 RX ORDER — ROSUVASTATIN CALCIUM 20 MG/1
20 TABLET, COATED ORAL DAILY
Qty: 90 TABLET | Refills: 3 | Status: SHIPPED | OUTPATIENT
Start: 2023-07-10 | End: 2024-07-09

## 2023-07-10 NOTE — TELEPHONE ENCOUNTER
Lov 7/6/23  Pt states you talked to him via phone last week  regarding his recent labs and informed him that you would be sending a rx to [facility] for a statin. I do not show any note regarding this or any rx that was sent in.   Pt also would like to know if you could be his pcp. He was under the impression he was doing his annual and est care with you. Hasmukh if maybe the chart just wasn't updated. Let me know if you are willing to take him and I can update his pcp info.     Please advise

## 2023-07-10 NOTE — TELEPHONE ENCOUNTER
I didn't put my name down as his PCP because he stated he wanted to continue with Dr. Trejo. I'm fine with being his PCP. Will fill his statin. Let him know I need to see him in 6 months for labs and visit. TY

## 2023-07-12 ENCOUNTER — LAB VISIT (OUTPATIENT)
Dept: LAB | Facility: HOSPITAL | Age: 53
End: 2023-07-12
Attending: NURSE PRACTITIONER
Payer: COMMERCIAL

## 2023-07-12 DIAGNOSIS — R53.83 FATIGUE, UNSPECIFIED TYPE: ICD-10-CM

## 2023-07-12 LAB — TESTOST SERPL-MCNC: 803 NG/DL (ref 304–1227)

## 2023-07-12 PROCEDURE — 84403 ASSAY OF TOTAL TESTOSTERONE: CPT | Performed by: NURSE PRACTITIONER

## 2023-07-12 PROCEDURE — 36415 COLL VENOUS BLD VENIPUNCTURE: CPT | Mod: PN | Performed by: NURSE PRACTITIONER

## 2024-10-09 ENCOUNTER — HOSPITAL ENCOUNTER (OUTPATIENT)
Dept: RADIOLOGY | Facility: HOSPITAL | Age: 54
Discharge: HOME | End: 2024-10-09
Payer: COMMERCIAL

## 2024-10-09 ENCOUNTER — OFFICE VISIT (OUTPATIENT)
Dept: ORTHOPEDICS | Facility: CLINIC | Age: 54
End: 2024-10-09
Payer: COMMERCIAL

## 2024-10-09 VITALS — HEIGHT: 72 IN | BODY MASS INDEX: 34.53 KG/M2 | WEIGHT: 254.94 LBS

## 2024-10-09 DIAGNOSIS — M54.50 LOW BACK PAIN, UNSPECIFIED BACK PAIN LATERALITY, UNSPECIFIED CHRONICITY, UNSPECIFIED WHETHER SCIATICA PRESENT: ICD-10-CM

## 2024-10-09 DIAGNOSIS — M47.812 CERVICAL SPONDYLOSIS: ICD-10-CM

## 2024-10-09 DIAGNOSIS — G89.29 CHRONIC RIGHT-SIDED LOW BACK PAIN WITHOUT SCIATICA: ICD-10-CM

## 2024-10-09 DIAGNOSIS — M70.61 GREATER TROCHANTERIC BURSITIS OF RIGHT HIP: Primary | ICD-10-CM

## 2024-10-09 DIAGNOSIS — M54.9 DORSALGIA, UNSPECIFIED: ICD-10-CM

## 2024-10-09 DIAGNOSIS — M54.2 CERVICALGIA: ICD-10-CM

## 2024-10-09 DIAGNOSIS — M54.2 NECK PAIN: ICD-10-CM

## 2024-10-09 DIAGNOSIS — M54.50 CHRONIC RIGHT-SIDED LOW BACK PAIN WITHOUT SCIATICA: ICD-10-CM

## 2024-10-09 PROCEDURE — 4010F ACE/ARB THERAPY RXD/TAKEN: CPT | Mod: CPTII,S$GLB,,

## 2024-10-09 PROCEDURE — 72050 X-RAY EXAM NECK SPINE 4/5VWS: CPT | Mod: 26,,, | Performed by: RADIOLOGY

## 2024-10-09 PROCEDURE — 72110 X-RAY EXAM L-2 SPINE 4/>VWS: CPT | Mod: TC

## 2024-10-09 PROCEDURE — 72110 X-RAY EXAM L-2 SPINE 4/>VWS: CPT | Mod: 26,,, | Performed by: RADIOLOGY

## 2024-10-09 PROCEDURE — 1160F RVW MEDS BY RX/DR IN RCRD: CPT | Mod: CPTII,S$GLB,,

## 2024-10-09 PROCEDURE — 72050 X-RAY EXAM NECK SPINE 4/5VWS: CPT | Mod: TC

## 2024-10-09 PROCEDURE — 3008F BODY MASS INDEX DOCD: CPT | Mod: CPTII,S$GLB,,

## 2024-10-09 PROCEDURE — 99999 PR PBB SHADOW E&M-EST. PATIENT-LVL IV: CPT | Mod: PBBFAC,,,

## 2024-10-09 PROCEDURE — 1159F MED LIST DOCD IN RCRD: CPT | Mod: CPTII,S$GLB,,

## 2024-10-09 PROCEDURE — 99204 OFFICE O/P NEW MOD 45 MIN: CPT | Mod: 25,S$GLB,,

## 2024-10-09 PROCEDURE — 20610 DRAIN/INJ JOINT/BURSA W/O US: CPT | Mod: RT,S$GLB,,

## 2024-10-09 RX ORDER — METHOCARBAMOL 500 MG/1
500 TABLET, FILM COATED ORAL 4 TIMES DAILY
Qty: 40 TABLET | Refills: 0 | Status: SHIPPED | OUTPATIENT
Start: 2024-10-09

## 2024-10-09 RX ORDER — GABAPENTIN 300 MG/1
300 CAPSULE ORAL 3 TIMES DAILY
Qty: 90 CAPSULE | Refills: 0 | Status: SHIPPED | OUTPATIENT
Start: 2024-10-09

## 2024-10-09 RX ORDER — VALSARTAN 160 MG/1
160 TABLET ORAL DAILY
COMMUNITY

## 2024-10-10 RX ORDER — TRIAMCINOLONE ACETONIDE 40 MG/ML
40 INJECTION, SUSPENSION INTRA-ARTICULAR; INTRAMUSCULAR ONCE
Status: DISCONTINUED | OUTPATIENT
Start: 2024-10-10 | End: 2024-10-10

## 2024-10-10 RX ORDER — TRIAMCINOLONE ACETONIDE 40 MG/ML
40 INJECTION, SUSPENSION INTRA-ARTICULAR; INTRAMUSCULAR ONCE
Status: COMPLETED | OUTPATIENT
Start: 2024-10-10 | End: 2024-10-10

## 2024-10-10 RX ADMIN — TRIAMCINOLONE ACETONIDE 40 MG: 40 INJECTION, SUSPENSION INTRA-ARTICULAR; INTRAMUSCULAR at 11:10

## 2024-10-10 NOTE — PROGRESS NOTES
SUBJECTIVE:     Chief Complaint & History of Present Illness:  Freddie Segura is a 54 y.o. male who is seen here today with a complaint of bilateral hip and neck pain. The bilateral hip pain has been present for about 1 year but has recently worsened over the past couple of months. The patient describes the pain as a moderate sharp pain located in the lateral aspect of each hip that has significantly worse in the right hip. The pain is worse with walking and lying onto his right side and improved by nothing. The patient notes numbness of the right anterior shin from the knee to the foot but no associated injury, effusion, or weakness.  He denies any prior treatments of the hip pain.    The neck pain has been present for about 3 years. The patient describes the pain as a moderate sharp burning pain located in the right lateral neck with radiation to the 1st 3 digits. The pain is worse with right lateral neck flexion and improved by repositioning his neck and arm. The patient notes occasional numbness and tingling of right neck to the 1st 3 digits the but no associated weakness or injury to the right arm or neck.  Patient notes seeing a neurologist previously but denied treatment due to practitioner only offering pain medication.      Past Medical History:   Diagnosis Date    Hypertension        Past Surgical History:   Procedure Laterality Date    SINUS SURGERY Bilateral 06/01/2017       No family history on file.    Review of patient's allergies indicates:  No Known Allergies        Current Outpatient Medications:     amLODIPine (NORVASC) 10 MG tablet, Take 10 mg by mouth once daily., Disp: , Rfl:     valsartan (DIOVAN) 160 MG tablet, Take 160 mg by mouth once daily., Disp: , Rfl:     benazepril (LOTENSIN) 40 MG tablet, Take 40 mg by mouth once daily. (Patient not taking: Reported on 10/9/2024), Disp: , Rfl:     fluticasone (FLONASE) 50 mcg/actuation nasal spray, 1 spray by Each Nare route once daily. (Patient  not taking: Reported on 10/9/2024), Disp: , Rfl:     gabapentin (NEURONTIN) 300 MG capsule, Take 1 capsule (300 mg total) by mouth 3 (three) times daily., Disp: 90 capsule, Rfl: 0    loratadine (CLARITIN) 10 mg tablet, Take 10 mg by mouth once daily. (Patient not taking: Reported on 10/9/2024), Disp: , Rfl:     methocarbamoL (ROBAXIN) 500 MG Tab, Take 1 tablet (500 mg total) by mouth 4 (four) times daily., Disp: 40 tablet, Rfl: 0    mupirocin (BACTROBAN) 2 % ointment, Apply topically 2 (two) times daily. (Patient not taking: Reported on 10/9/2024), Disp: 15 g, Rfl: 0    rosuvastatin (CRESTOR) 20 MG tablet, Take 1 tablet (20 mg total) by mouth once daily., Disp: 90 tablet, Rfl: 3      Review of Systems:  ROS:  The updated medical history is in the chart and has been reviewed. A review of systems is updated and there is no reported vision changes, ear/nose/mouth/throat complaints, chest pain, shortness of breath, abdominal pain, urological complaints, fevers or chills, psychiatric complaints. Musculoskeletal and neurologcial symptoms are as documented. All other systems are negative.      OBJECTIVE:     PHYSICAL EXAM:  Ht 6' (1.829 m)   Wt 115.6 kg (254 lb 15.4 oz)   BMI 34.58 kg/m²   General: Pleasant, cooperative, NAD.  HEENT: NCAT, sclera nonicteric.  Lungs: Respirations are equal and unlabored.   Abdomen: Soft and non-tender.  CV: 2+ bilateral upper and lower extremity pulses.  Neuro: Sensation intact to light touch.  Skin: Intact throughout with no rashes, erythema, or lesions.  Extremities: No LE edema, NVI lower extremities. normal gait.    Back Exam:  Lumbar spine  Tenderness: L4-L5   Swelling:  None       Range of Motion:      Flexion: 50     Extension: 10     Lateral Bend:   Right 10                              Left 10     Rotation:          Right 5                              Left 5       SLR:                  Right Negative                             Left Negative       Muscle Strength      Hip  Flexion 5/5     Hip Extension 5/5     Abductor: 5/5     Adductor: 5/5     Quadriceps: 5/5     Hamstrings: 5/5     Gastrocnemius: 5/5     Anterior tibialis: 5/5       Reflexes     Patellar: Normal    Achilles: Normal       Sensation: Mildly decreased right anterior shin       Cervical spine  Tenderness: L4-L5   Swelling:  None       Range of Motion:      Flexion: 40     Extension: 10     Lateral Bend:   Right 10                              Left 10     Rotation:          Right 5                              Left 5       Muscle Strength      Neck Flexion: 5/5     Neck Extension: 5/5     Neck flexion right: 5/5     Neck flexion left: 5/5     Neck rotation right: 5/5     Neck rotation left: 5/5   Bilateral upper extremity: 5/5       Reflexes     Biceps: Normal    Triceps: Normal   Brachioradialis: Normal       Sensation: Normal       right Hip Exam:  TTP: Greater trochanter  90 degrees flexion  10 degrees extension   10 degrees internal rotation  30 degrees external rotation  30 degrees abduction  20 degrees adduction   0 flexion contracture   positive DORIS   negative FADIR  negative Stinchfield    left Hip Exam:  TTP:  Mild greater trochanter  90 degrees flexion  10 degrees extension   10 degrees internal rotation  30 degrees external rotation  30 degrees abduction  20 degrees adduction   0 flexion contracture   negative DORIS   negative FADIR  negative Stincfield      RADIOGRAPHS:  X-rays of the cervical spine taken today personally reviewed. Imaging reveals moderate degenerative disc disease with bridging osteophytes of C2-C7.     X-rays of the  lumbar spine taken today personally reviewed. Imaging reveals spondylosis of the lumbar spine including mild disc space narrowing L2-L4 and moderate disc space narrowing L5-S1.    ASSESSMENT/PLAN:       ICD-10-CM ICD-9-CM   1. Chronic right-sided low back pain without sciatica  M54.50 724.2    G89.29 338.29   2. Cervical spondylosis  M47.812 721.0   3. Dorsalgia,  unspecified  M54.9 724.5   4. Cervicalgia  M54.2 723.1   5. Low back pain, unspecified back pain laterality, unspecified chronicity, unspecified whether sciatica present  M54.50 724.2       ASSESSMENT/PLAN:     We discussed with the patient at length all the different treatment options available including anti-inflammatories, acetaminophen, rest, ice, physical therapy, strengthening and range of motion exercise, occasional cortisone injections for temporary relief, and finally possible surgical interventions.    Due to patient's right hip pain being the most significant limitation during the visit, right hip greater trochanteric bursa CSI given.    Methocarbamol and gabapentin sent to pharmacy for cervical and lumbar spondylosis.    MRI without contrast of the cervical spine and lumbar spine ordered.  I will call the patient with the results.  Outpatient referral to back and spine team for evaluation and optimization of neck and lower back pain.    Greater Trochanter Bursa Injection Procedure Note   Diagnosis: right greater trochanteric bursitis  Indications: right hip pain  Procedure Details: Verbal consent was obtained for the procedure. The injection site was identified and the skin was prepared with alcohol. The right hip was injected from a lateral approach with 1 ml of Kenalog and 4 ml Lidocaine under sterile technique using a 25 gauge 1 1/2 inch needle. The needle was removed and the area cleansed and dressed.  Complications:  Patient tolerated the procedure well.    he was advised to rest the leg today, using ice and Tylenol as needed for comfort and swelling. he may have an increase in discomfort tonight followed by steady improvement over the next several days. It may take 1-3 weeks following the injection to get the full benefit of the medication.         DISCLAIMER: This note was prepared with M*MetaNotes voice recognition transcription software. Garbled syntax, mangled pronouns, and other bizarre  constructions may be attributed to that software system.    Perez Bryant Jr., PASudhaC

## 2024-10-23 ENCOUNTER — HOSPITAL ENCOUNTER (OUTPATIENT)
Dept: RADIOLOGY | Facility: HOSPITAL | Age: 54
Discharge: HOME | End: 2024-10-23
Payer: MEDICAID

## 2024-10-23 ENCOUNTER — TELEPHONE (OUTPATIENT)
Dept: ORTHOPEDICS | Facility: CLINIC | Age: 54
End: 2024-10-23
Payer: MEDICAID

## 2024-10-23 ENCOUNTER — OFFICE VISIT (OUTPATIENT)
Dept: ORTHOPEDICS | Facility: CLINIC | Age: 54
End: 2024-10-23
Payer: MEDICAID

## 2024-10-23 VITALS — HEIGHT: 73 IN | WEIGHT: 254.88 LBS | BODY MASS INDEX: 33.78 KG/M2

## 2024-10-23 DIAGNOSIS — M54.2 CERVICALGIA: ICD-10-CM

## 2024-10-23 DIAGNOSIS — M47.812 CERVICAL SPONDYLOSIS: ICD-10-CM

## 2024-10-23 DIAGNOSIS — G89.29 CHRONIC RIGHT-SIDED LOW BACK PAIN WITHOUT SCIATICA: ICD-10-CM

## 2024-10-23 DIAGNOSIS — M54.50 CHRONIC RIGHT-SIDED LOW BACK PAIN WITHOUT SCIATICA: ICD-10-CM

## 2024-10-23 DIAGNOSIS — M54.9 DORSALGIA, UNSPECIFIED: ICD-10-CM

## 2024-10-23 PROCEDURE — 72148 MRI LUMBAR SPINE W/O DYE: CPT | Mod: 26,,, | Performed by: RADIOLOGY

## 2024-10-23 PROCEDURE — 72141 MRI NECK SPINE W/O DYE: CPT | Mod: 26,,, | Performed by: RADIOLOGY

## 2024-10-23 PROCEDURE — 72148 MRI LUMBAR SPINE W/O DYE: CPT | Mod: TC

## 2024-10-23 PROCEDURE — 99999 PR PBB SHADOW E&M-EST. PATIENT-LVL III: CPT | Mod: PBBFAC,,, | Performed by: ORTHOPAEDIC SURGERY

## 2024-10-23 PROCEDURE — 72141 MRI NECK SPINE W/O DYE: CPT | Mod: TC

## 2024-10-23 PROCEDURE — 99213 OFFICE O/P EST LOW 20 MIN: CPT | Mod: PBBFAC,25 | Performed by: ORTHOPAEDIC SURGERY

## 2024-10-23 NOTE — TELEPHONE ENCOUNTER
Pt able to get appointment scheduled for today.  ----- Message from Tavo sent at 10/23/2024 10:18 AM CDT -----  Contact: [phone]  Caller is requesting an earlier appointment then we can schedule.  Caller is requesting a message be sent to the provider.    When is the next available appointment with their provider:  None showing    Reason for the appointment:  back pain NP    Patient preference of timeframe to be scheduled:  wants original appt for today, was not notifiied about appt change, can do early tmrw morning if available    Would the patient like a call back, or a response through their [facility] portal?:   call    Comments:

## 2024-10-23 NOTE — PROGRESS NOTES
DATE: 10/23/2024  PATIENT: Freddie Segura    Supervising Physician: Jah Forde M.D.    CHIEF COMPLAINT: neck and right arm pain, low back and right leg pain    HISTORY:  Freddie Segura is a 54 y.o. male here for initial evaluation of low back and right leg pain (Back - 7, Leg - 7). The pain has been present for years, worsening over time. The patient describes the pain as aching and shooting.  The pain is worse with activity and improved by rest. There is positive associated numbness and tingling. There is mild subjective weakness. Prior treatments have included home exercises, but no ESIs or surgery.    The patient also has complaints of neck and right arm pain (Neck - 7, Arm - 7).  The pain has been present for years, worsening over time. The patient describes the pain as aching and shooting. The pain is worse with activity and improved by nothing. There is positive associated numbness and tingling in the right thumb and 2nd finger. There is mild subjective weakness. Prior treatments have included home exercises, but no ESIs or surgery.    The patient denies myelopathic symptoms such as handwriting changes or difficulty with buttons/coins/keys. Denies perineal paresthesias, bowel/bladder dysfunction.    PAST MEDICAL/SURGICAL HISTORY:  Past Medical History:   Diagnosis Date    Hypertension      Past Surgical History:   Procedure Laterality Date    SINUS SURGERY Bilateral 06/01/2017       Medications:   Current Outpatient Medications on File Prior to Visit   Medication Sig Dispense Refill    amLODIPine (NORVASC) 10 MG tablet Take 10 mg by mouth once daily.      benazepril (LOTENSIN) 40 MG tablet Take 40 mg by mouth once daily. (Patient not taking: Reported on 10/9/2024)      fluticasone (FLONASE) 50 mcg/actuation nasal spray 1 spray by Each Nare route once daily. (Patient not taking: Reported on 10/9/2024)      gabapentin (NEURONTIN) 300 MG capsule Take 1 capsule (300 mg total) by mouth 3 (three) times daily.  90 capsule 0    loratadine (CLARITIN) 10 mg tablet Take 10 mg by mouth once daily. (Patient not taking: Reported on 10/9/2024)      methocarbamoL (ROBAXIN) 500 MG Tab Take 1 tablet (500 mg total) by mouth 4 (four) times daily. 40 tablet 0    mupirocin (BACTROBAN) 2 % ointment Apply topically 2 (two) times daily. (Patient not taking: Reported on 10/9/2024) 15 g 0    rosuvastatin (CRESTOR) 20 MG tablet Take 1 tablet (20 mg total) by mouth once daily. 90 tablet 3    valsartan (DIOVAN) 160 MG tablet Take 160 mg by mouth once daily.       No current facility-administered medications on file prior to visit.       Social History:   Social History     Socioeconomic History    Marital status:    Tobacco Use    Smoking status: Never     Passive exposure: Never    Smokeless tobacco: Never   Substance and Sexual Activity    Alcohol use: Yes     Comment: Socially   Social History Narrative    ** Merged History Encounter **          Social Drivers of Health     Financial Resource Strain: Low Risk  (7/1/2024)    Received from [facility]    Overall Financial Resource Strain (CARDIA)     Difficulty of Paying Living Expenses: Not hard at all   Food Insecurity: No Food Insecurity (7/1/2024)    Received from [facility]    Hunger Vital Sign     Worried About Running Out of Food in the Last Year: Never true     Ran Out of Food in the Last Year: Never true   Transportation Needs: No Transportation Needs (7/1/2024)    Received from [facility]    PRAPARE - Transportation     Lack of Transportation (Medical): No     Lack of Transportation (Non-Medical): No   Physical Activity: Sufficiently Active (7/1/2024)    Received from [facility]    Exercise Vital Sign     Days of Exercise per Week: 7 days     Minutes of Exercise per Session: 30 min   Stress: Stress Concern Present (7/1/2024)    Received from [facility]     [facility] of Occupational Health - Occupational Stress Questionnaire     Feeling of Stress : Rather much   Housing Stability: Low Risk  (7/1/2024)    Received from [facility]    Housing Stability Vital Sign     Unable to Pay for Housing in the Last Year: No     Number of Times Moved in the Last Year: 0     Homeless in the Last Year: No       REVIEW OF SYSTEMS:  Constitution: Negative. Negative for chills, fever and night sweats.   Cardiovascular: Negative for chest pain and syncope.   Respiratory: Negative for cough and shortness of breath.   Gastrointestinal: See HPI. Negative for nausea/vomiting. Negative for abdominal pain.  Genitourinary: See HPI. Negative for discoloration or dysuria.  Skin: Negative for dry skin, itching and rash.   Hematologic/Lymphatic: Negative for bleeding problem. Does not bruise/bleed easily.   Musculoskeletal: Negative for falls and muscle weakness.   Neurological: See HPI. No seizures.   Endocrine: Negative for polydipsia, polyphagia and polyuria.   Allergic/Immunologic: Negative for hives and persistent infections.     EXAM:  There were no vitals taken for this visit.    PHYSICAL EXAMINATION:    General: The patient is a very pleasant 54 y.o. male in no apparent distress, the patient is oriented to person, place and time.  Psych: Normal mood and affect  HEENT: Vision grossly intact, hearing intact to the spoken word.  Lungs: Respirations unlabored.  Gait: Normal station and gait, no difficulty with toe or heel walk.   Skin: Cervical skin and dorsal lumbar skin negative for rashes, lesions, hairy patches and surgical scars.    Range of motion: Cervical and lumbar range of motion is acceptable. There is mild tenderness to palpation of the paracervical muscles.  There is mild lumbar tenderness to palpation.  Spinal Balance: Global saggital and coronal spinal balance acceptable, no significant for scoliosis and kyphosis.  Musculoskeletal: No pain with the  range of motion of the bilateral shoulders and elbows. Normal bulk and contour of the bilateral hands.  No pain with the range of motion of the bilateral hips. Mild bilateral trochanteric tenderness to palpation.  Vascular: Bilateral upper and lower extremities warm and well perfused, radial pulses 2+ bilaterally, dorsalis pedis pulses 2+ bilaterally.  Neurological: Normal strength and tone in all major motor groups in the bilateral upper and lower extremities. Normal sensation to light touch in the C5-T1 and L2-S1 dermatomes bilaterally.  Deep tendon reflexes symmetric 2+ in the bilateral upper and lower extremities.  Negative Inverted Radial Reflex and Estrada's bilaterally. Negative Babinski bilaterally. Negative straight leg raise bilaterally.     IMAGING:   Today I personally reviewed AP, Lat and Flex/Ex  upright C-spine films that demonstrate significant degenerative changes.    AP, Lat and Flex/Ex upright lumbar spine films demonstrate significant degenerative changes with trace anterolisthesis of L5 on S1.     There is no height or weight on file to calculate BMI.    Hemoglobin A1C   Date Value Ref Range Status   07/06/2023 5.3 4.0 - 5.6 % Final     Comment:     ADA Screening Guidelines:  5.7-6.4%  Consistent with prediabetes  >or=6.5%  Consistent with diabetes    High levels of fetal hemoglobin interfere with the HbA1C  assay. Heterozygous hemoglobin variants (HbS, HgC, etc)do  not significantly interfere with this assay.   However, presence of multiple variants may affect accuracy.           ASSESSMENT/PLAN:    There are no diagnoses linked to this encounter.  Today we discussed at length all of the different treatment options including anti-inflammatories, acetaminophen, rest, ice, heat, physical therapy including strengthening and stretching exercises, home exercises, ROM, aerobic conditioning, aqua therapy, other modalities including ultrasound, massage, and dry needling, epidural steroid injections and  finally surgical intervention.      Pt presents with chronic cervical and lumbar radiculopathy. He is scheduled for MRIs tonight, will call tomorrow with results.

## 2024-10-23 NOTE — H&P (VIEW-ONLY)
DATE: 10/23/2024  PATIENT: Freddie Segura    Supervising Physician: Jah Forde M.D.    CHIEF COMPLAINT: neck and right arm pain, low back and right leg pain    HISTORY:  Freddie Segura is a 54 y.o. male here for initial evaluation of low back and right leg pain (Back - 7, Leg - 7). The pain has been present for years, worsening over time. The patient describes the pain as aching and shooting.  The pain is worse with activity and improved by rest. There is positive associated numbness and tingling. There is mild subjective weakness. Prior treatments have included home exercises, but no ESIs or surgery.    The patient also has complaints of neck and right arm pain (Neck - 7, Arm - 7).  The pain has been present for years, worsening over time. The patient describes the pain as aching and shooting. The pain is worse with activity and improved by nothing. There is positive associated numbness and tingling in the right thumb and 2nd finger. There is mild subjective weakness. Prior treatments have included home exercises, but no ESIs or surgery.    The patient denies myelopathic symptoms such as handwriting changes or difficulty with buttons/coins/keys. Denies perineal paresthesias, bowel/bladder dysfunction.    PAST MEDICAL/SURGICAL HISTORY:  Past Medical History:   Diagnosis Date    Hypertension      Past Surgical History:   Procedure Laterality Date    SINUS SURGERY Bilateral 06/01/2017       Medications:   Current Outpatient Medications on File Prior to Visit   Medication Sig Dispense Refill    amLODIPine (NORVASC) 10 MG tablet Take 10 mg by mouth once daily.      benazepril (LOTENSIN) 40 MG tablet Take 40 mg by mouth once daily. (Patient not taking: Reported on 10/9/2024)      fluticasone (FLONASE) 50 mcg/actuation nasal spray 1 spray by Each Nare route once daily. (Patient not taking: Reported on 10/9/2024)      gabapentin (NEURONTIN) 300 MG capsule Take 1 capsule (300 mg total) by mouth 3 (three) times daily.  90 capsule 0    loratadine (CLARITIN) 10 mg tablet Take 10 mg by mouth once daily. (Patient not taking: Reported on 10/9/2024)      methocarbamoL (ROBAXIN) 500 MG Tab Take 1 tablet (500 mg total) by mouth 4 (four) times daily. 40 tablet 0    mupirocin (BACTROBAN) 2 % ointment Apply topically 2 (two) times daily. (Patient not taking: Reported on 10/9/2024) 15 g 0    rosuvastatin (CRESTOR) 20 MG tablet Take 1 tablet (20 mg total) by mouth once daily. 90 tablet 3    valsartan (DIOVAN) 160 MG tablet Take 160 mg by mouth once daily.       No current facility-administered medications on file prior to visit.       Social History:   Social History     Socioeconomic History    Marital status:    Tobacco Use    Smoking status: Never     Passive exposure: Never    Smokeless tobacco: Never   Substance and Sexual Activity    Alcohol use: Yes     Comment: Socially   Social History Narrative    ** Merged History Encounter **          Social Drivers of Health     Financial Resource Strain: Low Risk  (7/1/2024)    Received from [facility]    Overall Financial Resource Strain (CARDIA)     Difficulty of Paying Living Expenses: Not hard at all   Food Insecurity: No Food Insecurity (7/1/2024)    Received from [facility]    Hunger Vital Sign     Worried About Running Out of Food in the Last Year: Never true     Ran Out of Food in the Last Year: Never true   Transportation Needs: No Transportation Needs (7/1/2024)    Received from [facility]    PRAPARE - Transportation     Lack of Transportation (Medical): No     Lack of Transportation (Non-Medical): No   Physical Activity: Sufficiently Active (7/1/2024)    Received from [facility]    Exercise Vital Sign     Days of Exercise per Week: 7 days     Minutes of Exercise per Session: 30 min   Stress: Stress Concern Present (7/1/2024)    Received from [facility]     [facility] [facility] [employer] - Occupational Stress Questionnaire     Feeling of Stress : Rather much   Housing Stability: Low Risk  (7/1/2024)    Received from [facility]    Housing Stability Vital Sign     Unable to Pay for Housing in the Last Year: No     Number of Times Moved in the Last Year: 0     Homeless in the Last Year: No       REVIEW OF SYSTEMS:  Constitution: Negative. Negative for chills, fever and night sweats.   Cardiovascular: Negative for chest pain and syncope.   Respiratory: Negative for cough and shortness of breath.   Gastrointestinal: See HPI. Negative for nausea/vomiting. Negative for abdominal pain.  Genitourinary: See HPI. Negative for discoloration or dysuria.  Skin: Negative for dry skin, itching and rash.   Hematologic/Lymphatic: Negative for bleeding problem. Does not bruise/bleed easily.   Musculoskeletal: Negative for falls and muscle weakness.   Neurological: See HPI. No seizures.   Endocrine: Negative for polydipsia, polyphagia and polyuria.   Allergic/Immunologic: Negative for hives and persistent infections.     EXAM:  There were no vitals taken for this visit.    PHYSICAL EXAMINATION:    General: The patient is a very pleasant 54 y.o. male in no apparent distress, the patient is oriented to person, place and time.  Psych: Normal mood and affect  HEENT: Vision grossly intact, hearing intact to the spoken word.  Lungs: Respirations unlabored.  Gait: Normal station and gait, no difficulty with toe or heel walk.   Skin: Cervical skin and dorsal lumbar skin negative for rashes, lesions, hairy patches and surgical scars.    Range of motion: Cervical and lumbar range of motion is acceptable. There is mild tenderness to palpation of the paracervical muscles.  There is mild lumbar tenderness to palpation.  Spinal Balance: Global saggital and coronal spinal balance acceptable, no significant for scoliosis and kyphosis.  Musculoskeletal: No pain with the  range of motion of the bilateral shoulders and elbows. Normal bulk and contour of the bilateral hands.  No pain with the range of motion of the bilateral hips. Mild bilateral trochanteric tenderness to palpation.  Vascular: Bilateral upper and lower extremities warm and well perfused, radial pulses 2+ bilaterally, dorsalis pedis pulses 2+ bilaterally.  Neurological: Normal strength and tone in all major motor groups in the bilateral upper and lower extremities. Normal sensation to light touch in the C5-T1 and L2-S1 dermatomes bilaterally.  Deep tendon reflexes symmetric 2+ in the bilateral upper and lower extremities.  Negative Inverted Radial Reflex and Estrada's bilaterally. Negative Babinski bilaterally. Negative straight leg raise bilaterally.     IMAGING:   Today I personally reviewed AP, Lat and Flex/Ex  upright C-spine films that demonstrate significant degenerative changes.    AP, Lat and Flex/Ex upright lumbar spine films demonstrate significant degenerative changes with trace anterolisthesis of L5 on S1.     There is no height or weight on file to calculate BMI.    Hemoglobin A1C   Date Value Ref Range Status   07/06/2023 5.3 4.0 - 5.6 % Final     Comment:     ADA Screening Guidelines:  5.7-6.4%  Consistent with prediabetes  >or=6.5%  Consistent with diabetes    High levels of fetal hemoglobin interfere with the HbA1C  assay. Heterozygous hemoglobin variants (HbS, HgC, etc)do  not significantly interfere with this assay.   However, presence of multiple variants may affect accuracy.           ASSESSMENT/PLAN:    There are no diagnoses linked to this encounter.  Today we discussed at length all of the different treatment options including anti-inflammatories, acetaminophen, rest, ice, heat, physical therapy including strengthening and stretching exercises, home exercises, ROM, aerobic conditioning, aqua therapy, other modalities including ultrasound, massage, and dry needling, epidural steroid injections and  finally surgical intervention.      Pt presents with chronic cervical and lumbar radiculopathy. He is scheduled for MRIs tonight, will call tomorrow with results.

## 2024-10-24 DIAGNOSIS — M54.12 CERVICAL RADICULOPATHY: Primary | ICD-10-CM

## 2024-10-24 DIAGNOSIS — M54.16 LUMBAR RADICULOPATHY: ICD-10-CM

## 2024-10-24 NOTE — PROGRESS NOTES
Spoke with pt virtually. Pt was last seen 10/23/24 and continues to have neck, low back, right arm, and right leg pain. Pt has tried home exercises and NSAIDs with no relief. Pain is 8/10. It is the [employer]OS spine conditioning program. Exercises include head rolls, kneeling back extension, sitting rotation stretch, modified seated side straddle, knee to chest, bird dog, plank, modified seated plank, hip bridges, abdominal bracing, and abdominal crunch. It is the [employer] cervical exercise program. Exercises include walking erectly with neutral head position, supine neutral head position, supine retraction, sitting or standing neck retraction, posture training, forward/backward/sideward isometric strengthening, prone head lifts, supine head lifts, scapular retraction, and neck rotation. Pt will complete each exercise twice daily for 6-8 weeks.   MRI cervical demonstrates multilevel cervical spondylosis with spinal canal and neural foraminal narrowing from C2-T1, worse at C4-C5 by moderate spinal canal stenosis. Evidence of OPLL. MRI lumbar demonstrates multilevel degenerative changes of the lumbar spine most prominent at L5-S1 with spondylolysis.  Will order right C6-7 C7-T1 TFESI and IL L5-S1 CHAYO with pain management. He is aware that they can't be done on same day. Pt will fu if pain persists. He would be a candidate for both cervical and lumbar spine surgery.    I had discussion with the patient regarding cervical myelopathy. I discussed the known stepwise degeneration of cervical myelopathy. I discussed the risks and benefits of decompressive surgery, including the concept that surgery would be done to prevent further neurological injury/degeneration rather than to ameliorate any existing deficits. He is not currently having myelopathic symptoms but will watch out for any and let me know.

## 2024-10-28 ENCOUNTER — TELEPHONE (OUTPATIENT)
Dept: PAIN MEDICINE | Facility: CLINIC | Age: 54
End: 2024-10-28
Payer: MEDICAID

## 2024-10-28 ENCOUNTER — TELEPHONE (OUTPATIENT)
Dept: ORTHOPEDICS | Facility: CLINIC | Age: 54
End: 2024-10-28
Payer: MEDICAID

## 2024-10-28 DIAGNOSIS — M54.16 LUMBAR RADICULOPATHY: Primary | ICD-10-CM

## 2024-10-28 DIAGNOSIS — M54.12 CERVICAL RADICULOPATHY: Primary | ICD-10-CM

## 2024-11-04 ENCOUNTER — TELEPHONE (OUTPATIENT)
Dept: PAIN MEDICINE | Facility: CLINIC | Age: 54
End: 2024-11-04
Payer: COMMERCIAL

## 2024-11-07 NOTE — PRE-PROCEDURE INSTRUCTIONS
Unable to reach pt via phone.  Left voicemail with arrival time also informing pt of need for responsible  accompaniment and instructing pt to follow pre-procedure instructions provided via [facility] portal.  The following message was sent to pt's portal.        Dear Freddie,     Please read over the following pre-procedure instructions in it's entirety as there is helpful information here to get you well prepared for your upcoming procedure.     You are scheduled for a procedure with Dr. Mackey on 11/11/2024.     [facility] at the corner of [facility] and [facility]. It is in the [place] next to [employer]. The address is: [address] [place] [address]. Take the elevator to the 2nd floor.       Registration check in time: 7:30 am  Procedure scheduled for time: 8:30 am     If you are receiving sedation, you CANNOT drive yourself and must have a responsible friend or family member (no rideshare) to drive you home.        You should take any medications that you routinely take for blood pressure, heart medications, thyroid, cholesterol, etc.      The fasting restrictions are dependent on whether or not you are receiving sedation. Sedation is not available for all procedures.      Your fasting instructions/Sedation type are as follow:  Oral Sedation. You do not need to fast before this procedure.  You can eat and drink like normal.  You CANNOT drive yourself and must have a .        If you are on blood thinners, you need to follow the anticoagulation instructions that had been discussed previously. You should only stop the blood thinners if it was approved by your primary care physician or your cardiologist. In the event that you are not able to stop your blood thinners, a blood thinner was not listed on your medication list, or we were not able to get clearance from your cardiologist, then the procedure may have to be postponed/canceled.      IF you were told to stop  your blood thinners, this is how long you should generally hold some of the more common ones. Remember that stopping blood thinners is only necessary for certain procedures. If you are unsure of your instructions, please call us.   Aspirin - 5 days  Plavix/Clopidogrel - 7 days  Warfarin / Coumadin - 5 days  Eliquis - 3 days  Pradaxa/Dabigatran - 4 days  Xarelto/Rivaroxaban - 3 days     *If you take Ozempic, Trulicity, Mounjaro, Rybelsus, Zepbound Or other weight loss, non-insulin injections you must hold this for one week (7 days) prior if you are having IV sedation.      If you are a diabetic, do not take your medication if you will be fasting, but bring it with you. Please plan on being here for roughly 2-3 hours.      Please call us if any of the following have occurred:  *running fever or having any flu-like symptoms  *have been taking antibiotics in the past 2 weeks  *have had any or plan on having any immunizations in the 2 weeks before or after your injection(Flu/Shingles/Covid Booster/Pneumonia, etc.)  *had any out patient procedures other than with us in the past 2 weeks (Colonoscopy, Endoscopy, Biopsy, OBGYN, Dental, etc.) Or received a steroid injection from another provider within the last 2 weeks.  *have any wounds or rashes  *awaiting ANY test results that could result in you having to take antibiotics (Urine Culture, Flu/Covid/Strept Swab, etc)     If you have been COVID positive, you will need to hold off on your procedure until you are symptom free for 10 days. If you did not have any symptoms, you can have your procedure 10 days from your positive test result.      On the morning of your procedure:  *HOLD ALL VITAMINS, MINERALS, HERBS (INCLUDING HERBAL TEAS) AND SUPPLEMENTS  *SHOWER WITH ANTIBACTERIAL SOAP (example: DIAL over the counter) NIGHT BEFORE AND MORNING OF PROCEDURE  *DO NOT APPLY ANY LOTIONS, OILS, POWDERS, PERFUME/COLOGNE, OINTMENTS, GELS, CREAMS, MAKEUP OR DEODORANT TO YOUR SKIN  MORNING OF PROCEDURE  *LEAVE JEWELRY AND ANY VALUABLES AT HOME  *WEAR LOOSE COMFORTABLE CLOTHING      If you have any questions please call [phone].    Please reply to this portal message as receipt of delivery.     Thank you,  [facility]  [place], LPN [facility]  Pre-Admit

## 2024-11-08 ENCOUNTER — TELEPHONE (OUTPATIENT)
Dept: PAIN MEDICINE | Facility: CLINIC | Age: 54
End: 2024-11-08
Payer: COMMERCIAL

## 2024-11-11 ENCOUNTER — HOSPITAL ENCOUNTER (OUTPATIENT)
Facility: HOSPITAL | Age: 54
Discharge: HOME | End: 2024-11-11
Attending: STUDENT IN AN ORGANIZED HEALTH CARE EDUCATION/TRAINING PROGRAM | Admitting: STUDENT IN AN ORGANIZED HEALTH CARE EDUCATION/TRAINING PROGRAM
Payer: COMMERCIAL

## 2024-11-11 VITALS
BODY MASS INDEX: 35.14 KG/M2 | SYSTOLIC BLOOD PRESSURE: 138 MMHG | OXYGEN SATURATION: 95 % | WEIGHT: 251 LBS | TEMPERATURE: 98 F | RESPIRATION RATE: 14 BRPM | HEIGHT: 71 IN | DIASTOLIC BLOOD PRESSURE: 86 MMHG | HEART RATE: 58 BPM

## 2024-11-11 DIAGNOSIS — M54.16 LUMBAR RADICULITIS: ICD-10-CM

## 2024-11-11 DIAGNOSIS — M54.16 LUMBAR RADICULOPATHY: Primary | ICD-10-CM

## 2024-11-11 PROCEDURE — 63600175 PHARM REV CODE 636 W HCPCS: Performed by: STUDENT IN AN ORGANIZED HEALTH CARE EDUCATION/TRAINING PROGRAM

## 2024-11-11 PROCEDURE — 62323 NJX INTERLAMINAR LMBR/SAC: CPT | Performed by: STUDENT IN AN ORGANIZED HEALTH CARE EDUCATION/TRAINING PROGRAM

## 2024-11-11 PROCEDURE — 25500020 PHARM REV CODE 255: Performed by: STUDENT IN AN ORGANIZED HEALTH CARE EDUCATION/TRAINING PROGRAM

## 2024-11-11 PROCEDURE — 62323 NJX INTERLAMINAR LMBR/SAC: CPT | Mod: ,,, | Performed by: STUDENT IN AN ORGANIZED HEALTH CARE EDUCATION/TRAINING PROGRAM

## 2024-11-11 PROCEDURE — 25000003 PHARM REV CODE 250: Performed by: STUDENT IN AN ORGANIZED HEALTH CARE EDUCATION/TRAINING PROGRAM

## 2024-11-11 RX ORDER — ALPRAZOLAM 0.5 MG/1
1 TABLET, ORALLY DISINTEGRATING ORAL ONCE AS NEEDED
Status: COMPLETED | OUTPATIENT
Start: 2024-11-11 | End: 2024-11-11

## 2024-11-11 RX ORDER — LIDOCAINE HYDROCHLORIDE 20 MG/ML
INJECTION, SOLUTION EPIDURAL; INFILTRATION; INTRACAUDAL; PERINEURAL
Status: DISCONTINUED | OUTPATIENT
Start: 2024-11-11 | End: 2024-11-11 | Stop reason: HOSPADM

## 2024-11-11 RX ORDER — DEXAMETHASONE SODIUM PHOSPHATE 10 MG/ML
INJECTION INTRAMUSCULAR; INTRAVENOUS
Status: DISCONTINUED | OUTPATIENT
Start: 2024-11-11 | End: 2024-11-11 | Stop reason: HOSPADM

## 2024-11-11 RX ADMIN — ALPRAZOLAM 1 MG: 0.5 TABLET, ORALLY DISINTEGRATING ORAL at 08:11

## 2024-11-11 NOTE — DISCHARGE INSTRUCTIONS
[facility]  [employer] service # [phone]  On-call pager for emergency# [phone]     POST-PROCEDURE INSTRUCTIONS:    Today you had an injection that included a steroid medications.  The steroid may or may not have been mixed with a local anesthetic when it was injected.   If the injection was in the neck, you may feel some pressure, numbness, or slight weakness in the arm after the procedure for a short period of time (this is a normal response), if this persists for longer than 1 day please contact our office or go to the emergency room.  If the injection was in the low back, you may feel some pressure, numbness, or slight weakness in the leg after the procedure for a short period of time (this is a normal response), if this persists for longer than 1 day please contact our office or go to the emergency room.  You may get side effects from the steroid.  This is not uncommon.  Symptoms include: elevated blood sugar, elevated blood pressure, headache, flushing, nausea, insomnia.  These symptoms are transient and will resolve within 1-3 days.  If symptoms last longer than this please contact our office or head to the emergency room.  Steroid medications can take anywhere from 3-14 days to take effect (rarely longer).  You may notice that your pain worsens for a short period of time after the injection, this would not be unusual due to the pressure and trauma from the needle.    If you do not have a follow up appointment scheduled, please contact my office (or the office of the physician who referred you for the procedure) to get a post-procedure follow up scheduled 2-4 weeks after the procedure.  This can be done as a virtual visit if that is more convenient for you.      What you need to do:    Keep a record of your response to the injection you had today.    How much relief did you get?   When did the relief start and how long did it last?  Were you  able to decrease the use of any of your pain medications?  Were you able to increase your level of activity?  How long did the relief last?    What to watch out for:    If you experience any of the following symptoms after your procedure, please notify the messaging service immediately (see above for contact information):   fever (increased oral temperature)   bleeding or swelling at the injection site,    drainage, rash or redness at the injection site    possible signs of infection    increased pain at the injection site   worsening of your usual pain   severe headache   new or worsening numbness    new arm and/or leg weakness, or    changes in bowel and/or bladder function: urinating or defecating on yourself and not knowing that you did it.    PLEASE FOLLOW ALL INSTRUCTIONS CAREFULLY     Do not engage in strenuous activity (e.g., lifting or pushing heavy objects or repeated bending) for 24 hours.     Do not take a bath, swim or use Jacuzzi for 24 hours after procedure. (A shower is fine).   Remove any Band-Aids when you get home.    Use cold/ice, as needed for comfort.  We recommend the use of cold therapy alternating on for 20 minutes, off for 20 minutes.    Do not apply direct heat (heating pad or heat packs) to the injection site for 24 hours.     Resume your usual medications, unless instructed otherwise by your Pain Physician.     If you are on warfarin (Coumadin) or other blood thinner, resume this medication as instructed by your prescribing Physician.    IF AT ANY POINT YOU ARE VERY CONCERNED ABOUT YOUR SYMPTOMS, PLEASE GO TO THE EMERGENCY ROOM.    If you develop worsening pain, weakness, numbness, lose bowel or bladder control (i.e., having an accident where you did not even know you had to go to the bathroom and suddenly noticed you soiled yourself), saddle anesthesia (a loss of sensation restricted to the area of the buttocks, anus and between the legs -- i.e., those parts of your body that would  touch a saddle if you were sitting on one) you need to go immediately to the emergency department for evaluation and treatment.    ----------------------------------------------------------------------------------------------------------------------------------------------------------------  If you received Sedation please read the following instructions:  POST SEDATION INSTRUCTIONS    Today you received intravenous medication (also known as sedation) that was used to help you relax and/or decrease discomfort during your procedure. This medication will be acting in your body for the next 24 hours, so you might feel a little tired or sleepy. This feeling will slowly wear off.   Common side effects associated with these medications include: drowsiness, dizziness, sleepiness, confusion, feeling excited, difficulty remembering things, lack of steadiness with walking or balance, loss of fine muscle control, slowed reflexes, difficulty focusing, and blurred vision.  Some over-the-counter and prescription medications (e.g., muscle relaxants, opioids, mood-altering medications, sedatives/hypnotics, antihistamines) can interact with the intravenous medication you received and cause an increased risk of the side effects listed above in addition to other potentially life threatening side effects. Use extreme caution if you are taking such medications, and consult with your Pain Physician or prescribing physician if you have any questions.  For the next 12-24 hours:    DO NOT--Drive a car, operate machinery or power tools   DO NOT--Drink any alcoholic beverages (not even beer), they may dangerously increase the risk of side effects.    DO NOT--Make any important legal or business decisions or sign important documents.  We advise you to have someone to assist you at home. Move slowly and carefully. Do not make sudden changes in position. Be aware of dizziness or light-headedness and move accordingly.   If you seek medical  treatment within 24 hours, let the nurse or doctor caring for you know that you have received the above medications. If you have any questions or concerns related to your sedation or treatment today please contact us.

## 2024-11-11 NOTE — DISCHARGE SUMMARY
[facility]  Discharge Note  Short Stay    Procedure(s) (LRB):  CHAYO L5-S1 (aim right) (N/A)      OUTCOME: Patient tolerated treatment/procedure well without complication and is now ready for discharge.    DISPOSITION: Home or Self Care    FINAL DIAGNOSIS:  <principal problem not specified>    FOLLOWUP: In clinic    DISCHARGE INSTRUCTIONS:  No discharge procedures on file.     TIME SPENT ON DISCHARGE: 10 minutes

## 2024-11-11 NOTE — OP NOTE
"PROCEDURE:  LUMBAR L5-S1 INTERLAMINAR EPIDURAL STEROID INJECTION    Patient Name: Freddie Segura  MRN: [id number]  DATE OF PROCEDURE: 11/11/2024    INJECTION # 1    DIAGNOSIS: Lumbar Radiculopathy  CPT CODE: 20908      POSTPROCEDURE DIAGNOSIS: Same    PHYSICIAN: Remi Mackey DO  NEEDLE TYPE: - 20G 3.5" Touhy Needle  MEDICATIONS INJECTED: 8cc mixture of 7cc Normal Saline + 10mg Dexamethasone (10mg/ml)  CONTRAST: Omni 300  LOSS OF RESISTANCE DEPTH: 8 cm    Sedation Medications - Oral Xanax 1mg    Estimated Blood Loss - <2ml  Drains: None  Specimens Removed: None  Urine Output - Not Measured  Complications: None  Outcome: Good    Informed Consent:  The patient's condition and proposed procedures, risks, and alternatives were discussed with the patient or responsible party.  The patient's / responsible party's questions were answered.   The patient / responsible party appeared to understand and chose to proceed.  Informed consent was obtained.  After obtaining written consent, an IV hep lock was placed. (See nurses notes for details).     Procedure in Detail:  The patient was taken back to the OR suite and placed in a prone position. The skin overlying the injection site was prepped and draped in an aseptic fashion. The target injection site (see above) was identified with fluoroscopy and marked.     Procedural Pause:  A procedural pause verifying correct patient, medical record number, allergies, medications to be administered, current vital signs, and surgical site was performed immediately prior to beginning the procedure.    The skin and subcutaneous tissue overlying the target site of injection for the L5-S1 epidural steroid injection was/were anesthetized using 4 mL of 2% lidocaine with a 25-gauge, 1½-inch needle.  The above noted Tuohy needle was advanced under fluoroscopic guidance towards the epidural space. Lateral fluoroscopic imaging was used to confirm depth. The epidural space was identified using " a loss of resistance to saline technique. (See above for loss of resistance depth). A microbore extension tubing was attached to the needle to minimize any movement of the needle during injection or syringe change.  After negative aspiration for heme or CSF, 1ml of contrast was injected to confirm placement and no intrathecal or vascular spread.  After repeat negative aspiration for heme or CSF, the above noted steroid solution was slowly injected in increments. The needle was then retracted approximately prison and the needle track was flushed with 0.5 ml of Lidocaine 2% to clear the needle prior to removal. The Tuohy needle was then removed.     The heart rate, pulse oximetry, and blood pressure were continuously monitored throughout the procedure.  The prpocedure was well tolerated. He was carefully escorted to the recovery room in stable condition. Patient was monitored by RN for recovery period.  The patient will be contacted in the next few days to determine extent of relief.  Patient was given post procedure and discharge instructions to follow at home.  The patient was discharged in a stable condition.    Note Electronically Signed By:  Remi Durand

## 2024-11-12 ENCOUNTER — TELEPHONE (OUTPATIENT)
Dept: PAIN MEDICINE | Facility: CLINIC | Age: 54
End: 2024-11-12
Payer: COMMERCIAL

## 2024-11-15 NOTE — PRE-PROCEDURE INSTRUCTIONS
Patient was informed of pre-procedure instructions and arrival time. Pt verbalized understanding and is to be accompanied by wife. PATIENT CONFIRMED ARRIVAL TIME OF 0830.    Patient denies any recent illness or abx/steroids in the last 14 days.

## 2024-11-18 ENCOUNTER — TELEPHONE (OUTPATIENT)
Dept: PAIN MEDICINE | Facility: CLINIC | Age: 54
End: 2024-11-18
Payer: COMMERCIAL

## 2024-11-18 NOTE — TELEPHONE ENCOUNTER
----- Message from Monique sent at 11/18/2024 12:31 PM CST -----  Type:  Sooner Apoointment Request    Caller is requesting a sooner appointment.  Caller declined first available appointment listed below.  Caller will not accept being placed on the waitlist and is requesting a message be sent to doctor.  Name of Caller: Pt  When is the first available appointment?   Symptoms:   Would the patient rather a call back or a response via [facility]?    Best Call Back [phone]:    Additional Information:  Pt called to cancel and reschedule appt

## 2024-11-20 ENCOUNTER — TELEPHONE (OUTPATIENT)
Dept: PAIN MEDICINE | Facility: CLINIC | Age: 54
End: 2024-11-20
Payer: COMMERCIAL

## 2024-11-20 DIAGNOSIS — M54.50 CHRONIC RIGHT-SIDED LOW BACK PAIN WITHOUT SCIATICA: ICD-10-CM

## 2024-11-20 DIAGNOSIS — M54.16 LUMBAR RADICULOPATHY: ICD-10-CM

## 2024-11-20 DIAGNOSIS — M54.12 CERVICAL RADICULOPATHY: Primary | ICD-10-CM

## 2024-11-20 DIAGNOSIS — G89.29 CHRONIC RIGHT-SIDED LOW BACK PAIN WITHOUT SCIATICA: ICD-10-CM

## 2024-12-03 ENCOUNTER — CLINICAL SUPPORT (OUTPATIENT)
Dept: REHABILITATION | Facility: HOSPITAL | Age: 54
End: 2024-12-03
Payer: COMMERCIAL

## 2024-12-03 DIAGNOSIS — M54.12 CERVICAL RADICULOPATHY: ICD-10-CM

## 2024-12-03 DIAGNOSIS — M54.50 LUMBAR PAIN: Primary | ICD-10-CM

## 2024-12-03 DIAGNOSIS — M54.16 LUMBAR RADICULOPATHY: ICD-10-CM

## 2024-12-03 PROCEDURE — 97110 THERAPEUTIC EXERCISES: CPT | Mod: PN

## 2024-12-03 PROCEDURE — 97162 PT EVAL MOD COMPLEX 30 MIN: CPT | Mod: PN

## 2024-12-03 NOTE — PROGRESS NOTES
"  [facility]  Physical Therapy Initial Evaluation - Lumbar    Name: Freddie Segura  Clinic Number: [id number]    Therapy Diagnosis:   Encounter Diagnoses   Name Primary?    Cervical radiculopathy     Lumbar radiculopathy      Physician: Christin Kelsey,*    Physician Orders: PT Eval and Treat   Medical Diagnosis from Referral:   M54.12 (ICD-10-CM) - Cervical radiculopathy   M54.16 (ICD-10-CM) - Lumbar radiculopathy     Evaluation Date: 12/3/2024  Authorization Period Expiration: 11/20/24  Plan of Care Expiration: 2/25/25 or 24 Visit  Progress Note Due: 1/2/2025  Visit # / Visits authorized: 1/ 1  Visits Remaining - 0  PTA visit #: 0/6  Precautions: Standard    Eval Visit FOTO-  (Date/Score)   5th Visit FOTO   -  (Date/Score)   10th Visit FOTO  -  (Date/Score)   D/C FOTO          -  (Date/Score)     Time In: 0800  Time Out: 0900  Total Appointment Time (timed & untimed codes): 60 minutes        Subjective     History of current condition - Freddie is a 54 y.o. year old male who presents to the [facility] PT clinic  with complaint of lumbar pain to the (middle right) with reports of numb from the popliteal fossa down to the foot. Patient reports a recent injection which has given him some relief. Pt report onset of the symptoms occurred  3 years prior to evaluation. Precipitating event:Insidious Onset . Current symptoms include: decrease in numbness and pain . Aggravating factors: all positions (standing/walking /lying ).   Treatment to date: pain injections. Patients presently rates pain 5/10 on pain scale.    Falls: none    Mechanism of Injury: insidious   Next MD Visit: 12/31/24 - neck injection        Imaging:MRI scan   Per radiology report "Multilevel degenerative changes of the lumbar spine most prominent at L5-S1 with spondylolysis and synovial cyst at left lateral recess resulting in severe left foraminal narrowing. Additional less severe stenosis without above. 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[employer])  Hobbies/Exercise: lift weights/walk/run  Hand Dominance: right  Prior Level of Function: Independent  Current Level of Function: Modified Independent secondary to lumbar pain     Pain:  Current 5/10, worst 10/10 (prolong standing or laying on right side increases pain), best 3/10 (laying flat on back  reduces pain)  Location: bilateral (right) lumbar   Description: Aching, Burning, Throbbing, Tight, Tingling, Sharp, and Shooting  Aggravating Factors: Sitting, Standing, Laying, Bending, Walking, Extension, and Flexing  Easing Factors:  movement from problem side    Pts goals: reduction in pain     Medical History:   Past Medical History:   Diagnosis Date    Hypertension        Surgical History:   Freddie Segura  has a past surgical history that includes Sinus surgery (Bilateral, 06/01/2017) and Epidural steroid injection into lumbar spine (N/A, 11/11/2024).    Medications:   Ferddie has a current medication list which includes the following prescription(s): amlodipine, benazepril, fluticasone propionate, gabapentin, loratadine, methocarbamol, mupirocin, rosuvastatin, and valsartan.    Allergies:   Review of patient's allergies indicates:  No Known Allergies     Objective     Repeated flexion/extension test - Flexing increases pain none - reduces pain   Posture: Poor  Palpation: mild generalized muscle tenderness  Sensation: impaired to light touch  Pelvic Observation: L/R Up-slip ; L/R anterior or posterior Rotation     Range of Motion/Strength:   Lumbar   Pain/Dysfunction with Movement   AROM     Flexion (80)  50°     Extension (25)  10°      Right side bend (35)  30°     Left side bend (35)  20°  With right sided pain   Right  Rotation (45)   55°     Left Rotation (45)  55°           LLE 5/5 4+/5 4/5 4-/5 3+/5 3/5 3-/5 2+/5 2/5 2-/5 1/5 0 NT   Hip Flexion     x                Hip Extension    x                 Hip Abduction    x                 Hip Adduction    x                 Hip Internal Rotation    x                 Hip External Rotation    x                 Knee Flexion   x                  Knee Extension   x                  Ankle Dorsiflexion   x                  Ankle Plantarflexion   x                    RLE 5/5 4+/5 4/5 4-/5 3+/5 3/5 3-/5 2+/5 2/5 2-/5 1/5 0 NT   Hip Flexion     x                Hip Extension     x                Hip Abduction    x                 Hip Adduction    x                 Hip Internal Rotation    x                 Hip External Rotation    x                 Knee Flexion   x                  Knee Extension   x                  Ankle Dorsiflexion   x                  Ankle Plantarflexion   x                    Lumbar 5/5 4+/5 4/5 4-/5 3+/5 3/5 3-/5 2+/5 2/5 2-/5 1/5 0 NT   Lumbar Flexion      x               Lumbar Extension      x               Lumbar Rotation Right       x               Lumbar Rotation Left       x               Lumbar Sidebending Right       x               Lumbar Sidebending Left      x                       Lumbar Segment Joint Mobility Comments   L1/L2 2+    L2/L3 2+    L3/L4 2+    L4/L5 2+    L5/S1 2+        Joint Mobility       Special Tests Left Right Comments   SLR Negative  Positive     DORIS negative negative    Piriformis  negative positive    Quadrant negative     Slump  negative     Flexibility       Hamstrings  -30 degrees  -30 degrees       Gait Analysis   Freddie Segura ambulated 100 feet with none device with independence assistance. Freddie Segura displays lack of extension gait deviation during 1 gait cycle.      Other:     Evaluation   Single Limb Stance R LE 13 sec  (<10 sec = HIGH FALL RISK)   Single Limb Stance L LE 20 sec  (<10 sec = HIGH FALL RISK)      Normative Values for  "Single Leg Stance, Eyes Open   Age: Time:   60-69 27 sec   70-79 17.2 sec   80-89 8.5 sec       Evaluation   30 second Chair Rise  (adults > 59 y/o) 9  completed with arms      Normative Scores for 30 sec Chair Rise (arms across chest; full stand and full sitting)    60-64 65-69 70-74 75-79 80-84 85-89 90-94   Range for Men 14-19 12-18 12-17 11-17 10-15 8-14 7-12   Range for Women 12-17 11-16 10-16 10-15 9-14 8-13 4-11       Evaluation   Timed Up and Go <14 sec      >14 seconds associated with high fall risk  > 30 seconds predictive of requiring ambulation device & being dependent in ADLs     Table: Population Norms for TUG    Age  Average TUG    60 - 69 years  8.1 seconds    70 - 79 years  9.2 seconds    80 - 99 years  11.3 seconds            TREATMENT   Treatment Time In: 0850  Treatment Time Out: 0900  Total Treatment time (time-based codes) separate from Evaluation: 10 minutes      Freddie received the treatments listed below:      therapeutic exercises to develop strength, ROM, and flexibility for 10 minutes including:  Warm-up      Supine    GSS - 3X30"  HSS - 3X30"  SKC - 30 reps bilateral   LE  Tyler stretch - 2' with and 2' without strap  SLR - 30 reps   Piriformis stretch - 3X30"   Supine hip Abduction with green TB - 30 reps     Seated        Standing                   Home Exercises and Patient Education Provided    Patient Education and Home Exercises     Education provided:   Patient was provided educational information regarding: role of Physical Therapy, short and long term goals, patient/therapist expectations, scheduling, and attendance policy.    Written Home Exercises Provided: yes. Exercises were reviewed and Freddie was able to demonstrate them prior to the end of the session.  Freddie demonstrated fair  understanding of the education provided. See EMR under Patient Instructions for exercises provided during therapy sessions.    Assessment     Freddie is a 54 y.o. male referred to outpatient " Physical Therapy with a medical diagnosis of Cervical radiculopathy and Lumbar radiculopathy. Pt presents with displays of weakness, impaired functional mobility, impaired balance, decreased lower extremity function, pain, and decreased ROM Patient currently present to therapy with reports of chronic cervical and lumbar pain. Patient displays a decrease in lordosis and increased cervical kyphosis. Patient will benefit from skilled therapy to address deficits.     Pt prognosis is Fair.   Patient will benefit from skilled outpatient Physical Therapy to address the deficits stated above and in the chart below, provide patient /family education, and to maximize patientt's level of independence.     Plan of care discussed with patient: Yes  Patient's spiritual, cultural and educational needs considered and patient is agreeable to the plan of care and goals as stated below:     Anticipated Barriers for therapy: None Identified during initial evaluation     Medical Necessity is demonstrated by the following  History  Co-morbidities and personal factors that may impact the plan of care [] LOW: no personal factors / co-morbidities  [x] MODERATE: 1-2 personal factors / co-morbidities  [] HIGH: 3+ personal factors / co-morbidities    Moderate / High Support Documentation:   Co-morbidities affecting plan of care:   -------------------------------------    Hypertension       Personal Factors:   no deficits     Examination  Body Structures and Functions, activity limitations and participation restrictions that may impact the plan of care [] LOW: addressing 1-2 elements  [x] MODERATE: 3+ elements  [] HIGH: 4+ elements (please support below)    Moderate / High Support Documentation:   -------------------------------------    Hypertension      Clinical Presentation [] LOW: stable  [x] MODERATE: Evolving  [] HIGH: Unstable     Decision Making/ Complexity Score: moderate         Goals:  Short Term Goals: 4 weeks 12/31/2024 (4)    Goal    Status     Pt. to report decreased lumbar pain </ =  8/10 at worst to increase tolerance for upright unsupported sitting posture.    Pt. to demonstrate proper posture requiring minimum verbal cues from PT for improved posture positioning     Increase L1 - L3 joint mobility to 3/6 to promote greater ease with squat to stand transitioning.    Increase lumbar flexion AROM >= 60 degrees to promote greater ease with self-care.    Increase lumbar side bending right >= +5 degrees to improve function reaching beyond base of support.     Pt to be independent with HEP to improve ROM and independence with ADL's        Long Term Goals: 8 weeks 1/28/2025 (8)    Goal   Status     Pt. to report decreased lumbar pain </ =  5/10 at worst to increase tolerance for upright unsupported sitting posture.    Pt. to demonstrate proper posture requiring minimum verbal cues from PT for improved posture positioning     Increase L3 - L5 joint mobility to 3/6 to promote greater ease with squat to stand transitioning.    Increase lumbar flexion AROM >= 70 degrees to promote greater ease with self-care.    Increase lumbar side bending left >= +15 degrees to improve function reaching beyond base of support.     Pt. to demonstrate increased MMT for rectus abdominus  >=  4/5 to improve supine to sitting transfer.    Pt. to demonstrate increased MMT for Lumbar extensor paraspinals t >=  4/5 to improve tolerance for prolong standing and ambulation     Pt. to demonstrate increased MMT for Lumbar/thoracic rotators   >= 4/5 to improve tolerance for ADL and work activities.     Pt. to demonstrate increased MMT for Lumbar/thoracic side bending   >= 4/5 to improve household cleaning.     Pt to be independent with HEP to improve ROM and independence with ADL's        Plan   Plan of care Certification: 12/3/2024 to   2/25/2025 (12)    Outpatient Physical Therapy 2 times weekly for 12 weeks to include the following interventions: Cervical/Lumbar Traction,  Manual Therapy, Neuromuscular Re-ed, Patient Education, Therapeutic Activities, Therapeutic Exercise, Ultrasound, and Integrative Dry Needling  .     Wild Elkins, PT,DPT  12/3/2024

## 2024-12-05 ENCOUNTER — CLINICAL SUPPORT (OUTPATIENT)
Dept: REHABILITATION | Facility: HOSPITAL | Age: 54
End: 2024-12-05
Payer: COMMERCIAL

## 2024-12-05 DIAGNOSIS — M54.50 LUMBAR PAIN: Primary | ICD-10-CM

## 2024-12-05 PROCEDURE — 97110 THERAPEUTIC EXERCISES: CPT | Mod: PN,CQ

## 2024-12-05 NOTE — PROGRESS NOTES
[facility]   Physical Therapy Treatment Note        Name: Freddie Segura  [facility] Number: [id number]    Therapy Diagnosis:   Encounter Diagnosis   Name Primary?    Lumbar pain Yes     Physician: Christin Kelsey,*    Visit Date: 12/5/2024  Last Scheduled Visit:     Physician Orders: PT Eval and Treat   Medical Diagnosis from Referral:   M54.12 (ICD-10-CM) - Cervical radiculopathy   M54.16 (ICD-10-CM) - Lumbar radiculopathy      Evaluation Date: 12/3/2024  Authorization Period Expiration: 11/20/24  Plan of Care Expiration: 2/25/25 or 24 Visit  Progress Note Due: 1/2/2025  Visit # / Visits authorized: 2/ 10  Visits Remaining - 6  PTA visit #: 1/6  Precautions: Standard     Eval Visit FOTO-  (Date/Score)   5th Visit FOTO   -  (Date/Score)   10th Visit FOTO  -  (Date/Score)   D/C FOTO          -  (Date/Score)     Time In: 8:00  Time Out: 9:00  Billable Time: 6- minutes  Insurance: Payor: [employer] / Plan: [employer] BASIC / Product Type: PPO /     Subjective     Pt reports: Back is painful today.  He was compliant with home exercise program.  Response to previous treatment: 1st session   Functional change: 1st session     Pain: 5/10  Location: bilateral lower back and R SH and cervical spine      Objective     Objective Measures Performed  - Evaluation     Treatment      Freddie received the treatments listed below (Bold exercise performed during 12/9/2024 visit):      therapeutic exercises to develop strength, endurance, ROM, and flexibility for 60 minutes including:    LTRx30  Bridges x30  Standing lumbar ext  Bike - 10'  Piriformis stretch in supine 2x1' B  Hamstring stretch 2x1' B    Home Exercises Provided and Patient Education Provided     Education provided:   - Continue with HEP    Written Home Exercises Provided: yes. Exercises were reviewed and Freddie was able to demonstrate them prior to the end of the session.  Freddie demonstrated fair  understanding of the  education provided. See EMR under Patient Instructions for exercises provided during therapy sessions    Assessment     Pt tolerated session well. Exercises performed to improve lumbar mobility and ROM. Pt had no increase in pain w/ any exercises and reported a decrease in pain by end of session. Freddie will continue to benefit from skilled therapy.     Freddie Is progressing well towards his goals.   Pt prognosis is Fair.     Pt will continue to benefit from skilled outpatient physical therapy to address the deficits listed in the problem list box on initial evaluation, provide pt/family education and to maximize pt's level of independence in the home and community environment.     Pt's spiritual, cultural and educational needs considered and pt agreeable to plan of care and goals.    Anticipated barriers to physical therapy: None Identified at eval    Goals:  Short Term Goals: 4 weeks 12/31/2024 (4)     Goal   Status     Pt. to report decreased lumbar pain </ =  8/10 at worst to increase tolerance for upright unsupported sitting posture.     Pt. to demonstrate proper posture requiring minimum verbal cues from PT for improved posture positioning      Increase L1 - L3 joint mobility to 3/6 to promote greater ease with squat to stand transitioning.     Increase lumbar flexion AROM >= 60 degrees to promote greater ease with self-care.     Increase lumbar side bending right >= +5 degrees to improve function reaching beyond base of support.      Pt to be independent with HEP to improve ROM and independence with ADL's           Long Term Goals: 8 weeks 1/28/2025 (8)     Goal   Status     Pt. to report decreased lumbar pain </ =  5/10 at worst to increase tolerance for upright unsupported sitting posture.     Pt. to demonstrate proper posture requiring minimum verbal cues from PT for improved posture positioning      Increase L3 - L5 joint mobility to 3/6 to promote greater ease with squat to stand transitioning.      Increase lumbar flexion AROM >= 70 degrees to promote greater ease with self-care.     Increase lumbar side bending left >= +15 degrees to improve function reaching beyond base of support.      Pt. to demonstrate increased MMT for rectus abdominus  >=  4/5 to improve supine to sitting transfer.     Pt. to demonstrate increased MMT for Lumbar extensor paraspinals t >=  4/5 to improve tolerance for prolong standing and ambulation      Pt. to demonstrate increased MMT for Lumbar/thoracic rotators   >= 4/5 to improve tolerance for ADL and work activities.      Pt. to demonstrate increased MMT for Lumbar/thoracic side bending   >= 4/5 to improve household cleaning.      Pt to be independent with HEP to improve ROM and independence with ADL's        Plan     Continued with current POC      Isidro Cabrera, PTA   12/9/2024

## 2024-12-09 ENCOUNTER — CLINICAL SUPPORT (OUTPATIENT)
Dept: REHABILITATION | Facility: HOSPITAL | Age: 54
End: 2024-12-09
Payer: COMMERCIAL

## 2024-12-09 DIAGNOSIS — M54.50 LUMBAR PAIN: Primary | ICD-10-CM

## 2024-12-09 PROCEDURE — 97110 THERAPEUTIC EXERCISES: CPT | Mod: PN,CQ

## 2024-12-09 NOTE — PROGRESS NOTES
[facility]   Physical Therapy Treatment Note        Name: Freddie Segura  [facility] Number: [id number]    Therapy Diagnosis:   Encounter Diagnosis   Name Primary?    Lumbar pain Yes     Physician: Christin Kelsey,*    Visit Date: 12/9/2024  Last Scheduled Visit:     Physician Orders: PT Eval and Treat   Medical Diagnosis from Referral:   M54.12 (ICD-10-CM) - Cervical radiculopathy   M54.16 (ICD-10-CM) - Lumbar radiculopathy      Evaluation Date: 12/3/2024  Authorization Period Expiration: 11/20/24  Plan of Care Expiration: 2/25/25 or 24 Visit  Progress Note Due: 1/2/2025  Visit # / Visits authorized: 2/ 10  Visits Remaining - 6  PTA visit #: 2/6  Precautions: Standard     Eval Visit FOTO-  (Date/Score)   5th Visit FOTO   -  (Date/Score)   10th Visit FOTO  -  (Date/Score)   D/C FOTO          -  (Date/Score)     Time In: 8:09  Time Out: 9:00  Billable Time: 51 minutes / 30 min    Insurance: Payor: [employer] / Plan: [employer] [facility] BASIC / Product Type: PPO /     Subjective     Pt reports: Pt would like to focus on neck today. Tightness in upper trap present.  He was compliant with home exercise program.  Response to previous treatment:   Functional change:     Pain: 3/10  Location: right SH and cervical spine and B lumbar spine      Objective     Objective Measures Performed  - Evaluation     Treatment      Freddie received the treatments listed below (Bold exercise performed during 12/9/2024 visit):      therapeutic exercises to develop strength, endurance, ROM, and flexibility for 60 minutes including:    Lumbar exercises  LTRx30  Bridges x30  Standing lumbar ext  Bike - 10'  Piriformis stretch in supine 2x1' B  Hamstring stretch 2x1' B    Cervical exercises  UBE 5'/5'  Scapular retraction 3x10  Scapular elevation 3x10  Rows 3x10 BTB  Chin tucks 3x10  B Upper trap stretch 3x30s  B Levator scap stretch 3x30s   Pec stretch 4x30s          Home Exercises Provided and Patient  Education Provided     Education provided:   - Continue with HEP    Written Home Exercises Provided: yes. Exercises were reviewed and Freddie was able to demonstrate them prior to the end of the session.  Freddie demonstrated fair  understanding of the education provided. See EMR under Patient Instructions for exercises provided during therapy sessions    Assessment     Pt tolerated session well. He presented w/ pain in his R Sh and cervical spine. Session focused on strengthening postural muscles and cervical extensors to reduce overuse of of R upper trap. Pt reported reduced pain and irritation in SH at end of session. PTA advised pt to perform some of today's cervical exercises throughout the day to reduce pain while driving. Freddie will continue to benefit from skilled therapy.     Freddie Is progressing well towards his goals.   Pt prognosis is Fair.     Pt will continue to benefit from skilled outpatient physical therapy to address the deficits listed in the problem list box on initial evaluation, provide pt/family education and to maximize pt's level of independence in the home and community environment.     Pt's spiritual, cultural and educational needs considered and pt agreeable to plan of care and goals.    Anticipated barriers to physical therapy: None Identified at eval    Goals:  Short Term Goals: 4 weeks 12/31/2024 (4)     Goal   Status     Pt. to report decreased lumbar pain </ =  8/10 at worst to increase tolerance for upright unsupported sitting posture.     Pt. to demonstrate proper posture requiring minimum verbal cues from PT for improved posture positioning      Increase L1 - L3 joint mobility to 3/6 to promote greater ease with squat to stand transitioning.     Increase lumbar flexion AROM >= 60 degrees to promote greater ease with self-care.     Increase lumbar side bending right >= +5 degrees to improve function reaching beyond base of support.      Pt to be independent with HEP to improve ROM  and independence with ADL's           Long Term Goals: 8 weeks 1/28/2025 (8)     Goal   Status     Pt. to report decreased lumbar pain </ =  5/10 at worst to increase tolerance for upright unsupported sitting posture.     Pt. to demonstrate proper posture requiring minimum verbal cues from PT for improved posture positioning      Increase L3 - L5 joint mobility to 3/6 to promote greater ease with squat to stand transitioning.     Increase lumbar flexion AROM >= 70 degrees to promote greater ease with self-care.     Increase lumbar side bending left >= +15 degrees to improve function reaching beyond base of support.      Pt. to demonstrate increased MMT for rectus abdominus  >=  4/5 to improve supine to sitting transfer.     Pt. to demonstrate increased MMT for Lumbar extensor paraspinals t >=  4/5 to improve tolerance for prolong standing and ambulation      Pt. to demonstrate increased MMT for Lumbar/thoracic rotators   >= 4/5 to improve tolerance for ADL and work activities.      Pt. to demonstrate increased MMT for Lumbar/thoracic side bending   >= 4/5 to improve household cleaning.      Pt to be independent with HEP to improve ROM and independence with ADL's        Plan     Continued with current SAQIB Cabrera, PTA   12/9/2024

## 2024-12-16 ENCOUNTER — CLINICAL SUPPORT (OUTPATIENT)
Dept: REHABILITATION | Facility: HOSPITAL | Age: 54
End: 2024-12-16
Payer: COMMERCIAL

## 2024-12-16 DIAGNOSIS — M54.50 LUMBAR PAIN: Primary | ICD-10-CM

## 2024-12-16 PROCEDURE — 97110 THERAPEUTIC EXERCISES: CPT | Mod: PN

## 2024-12-16 NOTE — PROGRESS NOTES
[facility]   Physical Therapy Treatment Note        Name: Freddie Segura  [facility] Number: [id number]    Therapy Diagnosis:   Encounter Diagnosis   Name Primary?    Lumbar pain Yes     Physician: Christin Kelsey,*    Visit Date: 12/16/2024  Last Scheduled Visit:     Physician Orders: PT Eval and Treat   Medical Diagnosis from Referral:   M54.12 (ICD-10-CM) - Cervical radiculopathy   M54.16 (ICD-10-CM) - Lumbar radiculopathy      Evaluation Date: 12/3/2024  Authorization Period Expiration: 11/20/24  Plan of Care Expiration: 2/25/25 or 24 Visit  Progress Note Due: 1/2/2025  Visit # / Visits authorized: 3/ 10  Visits Remaining - 6  PTA visit #: 0/6  Precautions: Standard     Eval Visit FOTO-  (Date/Score)   5th Visit FOTO   -  (Date/Score)   10th Visit FOTO  -  (Date/Score)   D/C FOTO          -  (Date/Score)     Time In: 8:07  Time Out: 9:00  Billable Time: 53 minutes / 53 min    Insurance: Payor: [employer] / Plan: [employer] [facility] BASIC / Product Type: PPO /     Subjective     Pt reports: would like to focus on lumbar due to pain with lifting.   He was compliant with home exercise program.  Response to previous treatment:   Functional change:     Pain: 3/10  Location: right SH and cervical spine and B lumbar spine      Objective     Objective Measures Performed  - Evaluation     Treatment      Freddie received the treatments listed below (Bold exercise performed during 12/16/2024 visit):      therapeutic exercises to develop strength, endurance, ROM, and flexibility for 53 minutes including:    Lumbar exercises  LTRx30  Bridges x30  Standing lumbar ext  Bike - 10'  Piriformis stretch in supine 2x1' B  Hamstring stretch 2x1' B    Cervical exercises  UBE 5'/5'  Scapular retraction 3x10  Scapular elevation 3x10  Rows 3x10 BTB  Chin tucks 3x10  B Upper trap stretch 3x30s  B Levator scap stretch 3x30s   Pec stretch 4x30s          Home Exercises Provided and Patient Education  Provided     Education provided:   - Continue with HEP    Written Home Exercises Provided: yes. Exercises were reviewed and Freddie was able to demonstrate them prior to the end of the session.  Freddie demonstrated fair  understanding of the education provided. See EMR under Patient Instructions for exercises provided during therapy sessions    Assessment     Patient reported to therapy with reports of  lumbar pain secondary to lifting boxes. Patient re-educated in regards to lifting boxes. Patient therapeutic exercise increased to improve lumbar strength in which Patient reports weakness/difficulty with activity. Patient will continue to benefit from skilled therapy to address current deficits.     Freddie Is progressing well towards his goals.   Pt prognosis is Fair.     Pt will continue to benefit from skilled outpatient physical therapy to address the deficits listed in the problem list box on initial evaluation, provide pt/family education and to maximize pt's level of independence in the home and community environment.     Pt's spiritual, cultural and educational needs considered and pt agreeable to plan of care and goals.    Anticipated barriers to physical therapy: None Identified at eval    Goals:  Short Term Goals: 4 weeks 12/31/2024 (4)     Goal   Status     Pt. to report decreased lumbar pain </ =  8/10 at worst to increase tolerance for upright unsupported sitting posture.     Pt. to demonstrate proper posture requiring minimum verbal cues from PT for improved posture positioning      Increase L1 - L3 joint mobility to 3/6 to promote greater ease with squat to stand transitioning.     Increase lumbar flexion AROM >= 60 degrees to promote greater ease with self-care.     Increase lumbar side bending right >= +5 degrees to improve function reaching beyond base of support.      Pt to be independent with HEP to improve ROM and independence with ADL's           Long Term Goals: 8 weeks 1/28/2025 (8)     Goal    Status     Pt. to report decreased lumbar pain </ =  5/10 at worst to increase tolerance for upright unsupported sitting posture.     Pt. to demonstrate proper posture requiring minimum verbal cues from PT for improved posture positioning      Increase L3 - L5 joint mobility to 3/6 to promote greater ease with squat to stand transitioning.     Increase lumbar flexion AROM >= 70 degrees to promote greater ease with self-care.     Increase lumbar side bending left >= +15 degrees to improve function reaching beyond base of support.      Pt. to demonstrate increased MMT for rectus abdominus  >=  4/5 to improve supine to sitting transfer.     Pt. to demonstrate increased MMT for Lumbar extensor paraspinals t >=  4/5 to improve tolerance for prolong standing and ambulation      Pt. to demonstrate increased MMT for Lumbar/thoracic rotators   >= 4/5 to improve tolerance for ADL and work activities.      Pt. to demonstrate increased MMT for Lumbar/thoracic side bending   >= 4/5 to improve household cleaning.      Pt to be independent with HEP to improve ROM and independence with ADL's        Plan     Continued with current POC      Wild Elkins, PT   12/16/2024

## 2024-12-19 ENCOUNTER — CLINICAL SUPPORT (OUTPATIENT)
Dept: REHABILITATION | Facility: HOSPITAL | Age: 54
End: 2024-12-19
Payer: COMMERCIAL

## 2024-12-19 DIAGNOSIS — M54.50 LUMBAR PAIN: Primary | ICD-10-CM

## 2024-12-19 PROCEDURE — 97110 THERAPEUTIC EXERCISES: CPT | Mod: PN,CQ

## 2024-12-19 NOTE — PROGRESS NOTES
[facility]   Physical Therapy Treatment Note        Name: Freddie Segura  [facility] Number: [id number]    Therapy Diagnosis:   Encounter Diagnosis   Name Primary?    Lumbar pain Yes     Physician: Christin Kelsey,*    Visit Date: 12/19/2024  Last Scheduled Visit:     Physician Orders: PT Eval and Treat   Medical Diagnosis from Referral:   M54.12 (ICD-10-CM) - Cervical radiculopathy   M54.16 (ICD-10-CM) - Lumbar radiculopathy      Evaluation Date: 12/3/2024  Authorization Period Expiration: 11/20/24  Plan of Care Expiration: 2/25/25 or 24 Visit  Progress Note Due: 1/2/2025  Visit # / Visits authorized: 4/ 10  Visits Remaining - 5  PTA visit #: 1/6  Precautions: Standard     Eval Visit FOTO-  (Date/Score)   5th Visit FOTO   -  (Date/Score)   10th Visit FOTO  -  (Date/Score)   D/C FOTO          -  (Date/Score)     Time In: 8:02  Time Out: 9:00  Billable Time: 58 minutes / 58 min  Insurance: Payor: [employer] / Plan: [employer]IC / Product Type: PPO /     Subjective     Pt reports: neck feels tight  He was compliant with home exercise program.  Response to previous treatment:   Functional change:     Pain: 3/10  Location: right SH and cervical spine and B lumbar spine      Objective     Objective Measures Performed  - Evaluation     Treatment      Freddie received the treatments listed below (Bold exercise performed during 12/19/2024 visit):      therapeutic exercises to develop strength, endurance, ROM, and flexibility for 58 minutes including:    Lumbar exercises  LTRx30  Bridges x30  Standing lumbar ext  Bike - 10'  Piriformis stretch in supine 2x1' B  Hamstring stretch 2x1' B  PPT x15    Cervical exercises  UBE 5'/5'  Scapular retraction 3x10  Scapular elevation 3x10  Rows 3x10 BTB  Chin tucks 3x10  B Upper trap stretch 3x30s  B Levator scap stretch 3x30s   Pec stretch 4x30s  Cervical AROM flex/ext and Rotation 3x10 ea        Home Exercises Provided and Patient  Education Provided     Education provided:   - Continue with HEP    Written Home Exercises Provided: yes. Exercises were reviewed and Freddie was able to demonstrate them prior to the end of the session.  Freddie demonstrated fair  understanding of the education provided. See EMR under Patient Instructions for exercises provided during therapy sessions    Assessment     Pt tolerated session well. He presented w/ stiffness in neck. Cervical exercises performed to reduce tension, pt reported relief following exercises. PPT performed to improve activation of Lower lumbar musculature. Tactile and cues used to improve ther ex techniques. Freddie had no adverse effects w/ exercises and will continue to benefit from skilled therapy.     Freddie Is progressing well towards his goals.   Pt prognosis is Fair.     Pt will continue to benefit from skilled outpatient physical therapy to address the deficits listed in the problem list box on initial evaluation, provide pt/family education and to maximize pt's level of independence in the home and community environment.     Pt's spiritual, cultural and educational needs considered and pt agreeable to plan of care and goals.    Anticipated barriers to physical therapy: None Identified at eval    Goals:  Short Term Goals: 4 weeks 12/31/2024 (4)     Goal   Status     Pt. to report decreased lumbar pain </ =  8/10 at worst to increase tolerance for upright unsupported sitting posture.     Pt. to demonstrate proper posture requiring minimum verbal cues from PT for improved posture positioning      Increase L1 - L3 joint mobility to 3/6 to promote greater ease with squat to stand transitioning.     Increase lumbar flexion AROM >= 60 degrees to promote greater ease with self-care.     Increase lumbar side bending right >= +5 degrees to improve function reaching beyond base of support.      Pt to be independent with HEP to improve ROM and independence with ADL's           Long Term Goals: 8  weeks 1/28/2025 (8)     Goal   Status     Pt. to report decreased lumbar pain </ =  5/10 at worst to increase tolerance for upright unsupported sitting posture.     Pt. to demonstrate proper posture requiring minimum verbal cues from PT for improved posture positioning      Increase L3 - L5 joint mobility to 3/6 to promote greater ease with squat to stand transitioning.     Increase lumbar flexion AROM >= 70 degrees to promote greater ease with self-care.     Increase lumbar side bending left >= +15 degrees to improve function reaching beyond base of support.      Pt. to demonstrate increased MMT for rectus abdominus  >=  4/5 to improve supine to sitting transfer.     Pt. to demonstrate increased MMT for Lumbar extensor paraspinals t >=  4/5 to improve tolerance for prolong standing and ambulation      Pt. to demonstrate increased MMT for Lumbar/thoracic rotators   >= 4/5 to improve tolerance for ADL and work activities.      Pt. to demonstrate increased MMT for Lumbar/thoracic side bending   >= 4/5 to improve household cleaning.      Pt to be independent with HEP to improve ROM and independence with ADL's        Plan     Continued with current SAQIB Cabrera, PTA   12/19/2024

## 2024-12-24 ENCOUNTER — TELEPHONE (OUTPATIENT)
Dept: PAIN MEDICINE | Facility: CLINIC | Age: 54
End: 2024-12-24
Payer: COMMERCIAL

## 2024-12-30 ENCOUNTER — CLINICAL SUPPORT (OUTPATIENT)
Dept: REHABILITATION | Facility: HOSPITAL | Age: 54
End: 2024-12-30
Payer: COMMERCIAL

## 2024-12-30 DIAGNOSIS — M54.50 LUMBAR PAIN: Primary | ICD-10-CM

## 2024-12-30 PROCEDURE — 97110 THERAPEUTIC EXERCISES: CPT | Mod: PN

## 2024-12-30 PROCEDURE — 97140 MANUAL THERAPY 1/> REGIONS: CPT | Mod: PN

## 2024-12-30 NOTE — PRE-PROCEDURE INSTRUCTIONS
Patient reviewed on 12/30/2024.  Okay to proceed at [facility]. The following pre-procedure instructions and arrival time have been reviewed with patient via phone and sent to patient portal for review.  Patient verbalized an understanding.  Pt to be accompanied by his fiance day of procedure as responsible .      Dear Freddie,     Please read over the following pre-procedure instructions in it's entirety as there is helpful information here to get you well prepared for your upcoming procedure.     You are scheduled for a procedure with Dr. Mackey on 12/31/2024.     [facility] at the corner of [address] and [facility]. It is in the [facility] [place] next to [facility]. The address is: [address] [place] [address]. Take the elevator to the 2nd floor.       Registration check in time: 6:00 am  Procedure scheduled for time: 7:50 am     If you are receiving sedation, you CANNOT drive yourself and must have a responsible friend or family member (no rideshare) to drive you home.        You should take any medications that you routinely take for blood pressure, heart medications, thyroid, cholesterol, etc.      The fasting restrictions are dependent on whether or not you are receiving sedation. Sedation is not available for all procedures.      Your fasting instructions/Sedation type are as follow:  IV sedation.   Nothing to eat after midnight the night prior to procedure.   Patients are encouraged to consume clear liquids up to 2 hours prior to scheduled arrival time.  -Clear liquids include Gatorade, water, soda, black coffee or tea (no milk or creamer), and clear juices. - Clear liquids do NOT include anything with pulp or food particles (chicken broth, ice cream, yogurt, Jello, etc.) You CANNOT drive yourself and must have a .           If you are on blood thinners, you need to follow the anticoagulation instructions that had been discussed previously. You should only stop the  blood thinners if it was approved by your primary care physician or your cardiologist. In the event that you are not able to stop your blood thinners, a blood thinner was not listed on your medication list, or we were not able to get clearance from your cardiologist, then the procedure may have to be postponed/canceled.      IF you were told to stop your blood thinners, this is how long you should generally hold some of the more common ones. Remember that stopping blood thinners is only necessary for certain procedures. If you are unsure of your instructions, please call us.   Aspirin - 5 days  Plavix/Clopidogrel - 7 days  Warfarin / Coumadin - 5 days  Eliquis - 3 days  Pradaxa/Dabigatran - 4 days  Xarelto/Rivaroxaban - 3 days     *If you take Ozempic, Trulicity, Mounjaro, Rybelsus, Zepbound Or other weight loss, non-insulin injections you must hold this for one week (7 days) prior if you are having IV sedation.      If you are a diabetic, do not take your medication if you will be fasting, but bring it with you. Please plan on being here for roughly 2-3 hours.      Please call us if any of the following have occurred:  *running fever or having any flu-like symptoms  *have been taking antibiotics in the past 2 weeks  *have had any or plan on having any immunizations in the 2 weeks before or after your injection(Flu/Shingles/Covid Booster/Pneumonia, etc.)  *had any out patient procedures other than with us in the past 2 weeks (Colonoscopy, Endoscopy, Biopsy, OBGYN, Dental, etc.) Or received a steroid injection from another provider within the last 2 weeks.  *have any wounds or rashes  *awaiting ANY test results that could result in you having to take antibiotics (Urine Culture, Flu/Covid/Strept Swab, etc)     If you have been COVID positive, you will need to hold off on your procedure until you are symptom free for 10 days. If you did not have any symptoms, you can have your procedure 10 days from your positive test  result.      On the morning of your procedure:  *HOLD ALL VITAMINS, MINERALS, HERBS (INCLUDING HERBAL TEAS) AND SUPPLEMENTS  *SHOWER WITH ANTIBACTERIAL SOAP (example: DIAL over the counter) NIGHT BEFORE AND MORNING OF PROCEDURE  *DO NOT APPLY ANY LOTIONS, OILS, POWDERS, PERFUME/COLOGNE, OINTMENTS, GELS, CREAMS, MAKEUP OR DEODORANT TO YOUR SKIN MORNING OF PROCEDURE  *LEAVE JEWELRY AND ANY VALUABLES AT HOME  *WEAR LOOSE COMFORTABLE CLOTHING      If you have any questions please call [phone].    Please reply to this portal message as receipt of delivery.     Thank you,  [facility]  [place], LPN [facility]  Pre-Admit

## 2024-12-30 NOTE — PROGRESS NOTES
[facility]   Physical Therapy Treatment Note        Name: Freddie [facility] Number: [id number]    Therapy Diagnosis:   Encounter Diagnosis   Name Primary?    Lumbar pain Yes     Physician: Christin Kelsey,*    Visit Date: 12/30/2024  Last Scheduled Visit:     Physician Orders: PT Eval and Treat   Medical Diagnosis from Referral:   M54.12 (ICD-10-CM) - Cervical radiculopathy   M54.16 (ICD-10-CM) - Lumbar radiculopathy      Evaluation Date: 12/3/2024  Authorization Period Expiration: 11/20/24  Plan of Care Expiration: 2/25/25 or 24 Visit  Progress Note Due: 1/30/2025  Visit # / Visits authorized: 5/ 10  Visits Remaining - 5  PTA visit #: 0/6  Precautions: Standard     Eval Visit FOTO-  (Date/Score)   5th Visit FOTO   -  (Date/Score)   10th Visit FOTO  -  (Date/Score)   D/C FOTO          -  (Date/Score)     Time In: 8:02  Time Out: 9:00  Billable Time: 58 minutes / 58 min  Insurance: Payor: [email or web address] / Plan: [employer] [facility] BASIC / Product Type: PPO /     Subjective     Pt reports: neck feels tight  He was compliant with home exercise program.  Response to previous treatment:   Functional change:     Pain: 3/10  Location: right SH and cervical spine and B lumbar spine      Objective     Objective Measures Performed  - 1/30/25     Range of Motion/Strength:   Lumbar    Pain/Dysfunction with Movement   AROM       Flexion (80)  60°      Extension (25)  20°       Right side bend (35)  30°      Left side bend (35)  20°  With right sided pain   Right  Rotation (45)  55°      Left Rotation (45)  55°               LLE 5/5 4+/5 4/5 4-/5 3+/5 3/5 3-/5 2+/5 2/5 2-/5 1/5 0 NT   Hip Flexion         x                   Hip Extension       x                     Hip Abduction       x                     Hip Adduction       x                     Hip Internal Rotation       x                     Hip External Rotation       x                     Knee Flexion     x                       Knee  Extension     x                       Ankle Dorsiflexion     x                       Ankle Plantarflexion     x                          RLE 5/5 4+/5 4/5 4-/5 3+/5 3/5 3-/5 2+/5 2/5 2-/5 1/5 0 NT   Hip Flexion         x                   Hip Extension         x                   Hip Abduction       x                     Hip Adduction       x                     Hip Internal Rotation       x                     Hip External Rotation       x                     Knee Flexion     x                       Knee Extension     x                       Ankle Dorsiflexion     x                       Ankle Plantarflexion     x                          Lumbar 5/5 4+/5 4/5 4-/5 3+/5 3/5 3-/5 2+/5 2/5 2-/5 1/5 0 NT   Lumbar Flexion           x                 Lumbar Extension           x                 Lumbar Rotation Right            x                 Lumbar Rotation Left            x                 Lumbar Sidebending Right            x                 Lumbar Sidebending Left           x                             Lumbar Segment Joint Mobility Comments   L1/L2 2+     L2/L3 2+     L3/L4 2+     L4/L5 2+     L5/S1 2+           Joint Mobility         Special Tests Left Right Comments   SLR Negative  Positive      DORIS negative negative     Piriformis  negative positive     Quadrant negative       Slump  negative       Flexibility          Hamstrings  -30 degrees  -30 degrees         Gait Analysis   Freddie Segura ambulated 100 feet with none device with independence assistance. Freddie Segura displays lack of extension gait deviation during 1 gait cycle.       Other:     Evaluation   Single Limb Stance R LE 13 sec  (<10 sec = HIGH FALL RISK)   Single Limb Stance L LE 20 sec  (<10 sec = HIGH FALL RISK)      Normative Values for Single Leg Stance, Eyes Open   Age: Time:   60-69 27 sec   70-79 17.2 sec   80-89 8.5 sec        Evaluation   30 second Chair Rise  (adults > 59 y/o) 9  completed with arms      Normative Scores for 30 sec  Chair Rise (arms across chest; full stand and full sitting)    60-64 65-69 70-74 75-79 80-84 85-89 90-94   Range for Men 14-19 12-18 12-17 11-17 10-15 8-14 7-12   Range for Women 12-17 11-16 10-16 10-15 9-14 8-13 4-11        Evaluation   Timed Up and Go <14 sec      >14 seconds associated with high fall risk  > 30 seconds predictive of requiring ambulation device & being dependent in ADLs     Table: Population Norms for TUG    Age  Average TUG    60 - 69 years  8.1 seconds    70 - 79 years  9.2 seconds    80 - 99 years  11.3 seconds         Treatment      Freddie received the treatments listed below (Bold exercise performed during 12/30/2024 visit):      therapeutic exercises to develop strength, endurance, ROM, and flexibility for 30 minutes including:    Lumbar exercises  LTRx30  Bridges x30  Standing lumbar ext  Bike - 10'  Piriformis stretch in supine 2x1' B  Hamstring stretch 2x1' B  PPT x15    Cervical exercises  UBE 5'/5'  Scapular retraction 3x10  Scapular elevation 3x10  Rows 3x10 BTB  Chin tucks 3x10  B Upper trap stretch 3x30s  B Levator scap stretch 3x30s   Pec stretch 4x30s  Cervical AROM flex/ext and Rotation 3x10 ea      Freddie received the following supervised modalities after being cleared for contradictions: Mechanical Traction:  Freddie received static mechanical traction to the lumbar spine at a force of 35 pounds for a total of 30 minutes. Hold time of 20 minutes and rest time for 0  Minutes. Patient tolerated treatment well without any adverse effects.     Home Exercises Provided and Patient Education Provided     Education provided:   - Continue with HEP    Written Home Exercises Provided: yes. Exercises were reviewed and Freddie was able to demonstrate them prior to the end of the session.  Freddie demonstrated fair  understanding of the education provided. See EMR under Patient Instructions for exercises provided during therapy sessions    Assessment       Patient currently presents to therapy  with reports of increased pain of the lumbar spine. Patient performed lumbar traction with reports of decreased lumbar pain following traction. Physical Therapy will stay at 35lbs of pull for next treatment. Freddie had no adverse effects w/ exercises and will continue to benefit from skilled therapy.     Freddie Is progressing well towards his goals.   Pt prognosis is Fair.     Pt will continue to benefit from skilled outpatient physical therapy to address the deficits listed in the problem list box on initial evaluation, provide pt/family education and to maximize pt's level of independence in the home and community environment.     Pt's spiritual, cultural and educational needs considered and pt agreeable to plan of care and goals.    Anticipated barriers to physical therapy: None Identified at eval    Goals:  Short Term Goals: 4 weeks 12/31/2024 (4)     Goal   Status     Pt. to report decreased lumbar pain </ =  8/10 at worst to increase tolerance for upright unsupported sitting posture.     Pt. to demonstrate proper posture requiring minimum verbal cues from PT for improved posture positioning      Increase L1 - L3 joint mobility to 3/6 to promote greater ease with squat to stand transitioning.     Increase lumbar flexion AROM >= 60 degrees to promote greater ease with self-care.     Increase lumbar side bending right >= +5 degrees to improve function reaching beyond base of support.      Pt to be independent with HEP to improve ROM and independence with ADL's           Long Term Goals: 8 weeks 1/28/2025 (8)     Goal   Status     Pt. to report decreased lumbar pain </ =  5/10 at worst to increase tolerance for upright unsupported sitting posture.     Pt. to demonstrate proper posture requiring minimum verbal cues from PT for improved posture positioning      Increase L3 - L5 joint mobility to 3/6 to promote greater ease with squat to stand transitioning.     Increase lumbar flexion AROM >= 70 degrees to promote  greater ease with self-care.     Increase lumbar side bending left >= +15 degrees to improve function reaching beyond base of support.      Pt. to demonstrate increased MMT for rectus abdominus  >=  4/5 to improve supine to sitting transfer.     Pt. to demonstrate increased MMT for Lumbar extensor paraspinals t >=  4/5 to improve tolerance for prolong standing and ambulation      Pt. to demonstrate increased MMT for Lumbar/thoracic rotators   >= 4/5 to improve tolerance for ADL and work activities.      Pt. to demonstrate increased MMT for Lumbar/thoracic side bending   >= 4/5 to improve household cleaning.      Pt to be independent with HEP to improve ROM and independence with ADL's        Plan     Continued with current POC      Wild Elkins, PT   12/30/2024

## 2024-12-31 ENCOUNTER — HOSPITAL ENCOUNTER (OUTPATIENT)
Facility: HOSPITAL | Age: 54
Discharge: HOME | End: 2024-12-31
Attending: STUDENT IN AN ORGANIZED HEALTH CARE EDUCATION/TRAINING PROGRAM | Admitting: STUDENT IN AN ORGANIZED HEALTH CARE EDUCATION/TRAINING PROGRAM
Payer: COMMERCIAL

## 2024-12-31 VITALS
HEART RATE: 73 BPM | OXYGEN SATURATION: 96 % | DIASTOLIC BLOOD PRESSURE: 76 MMHG | TEMPERATURE: 98 F | RESPIRATION RATE: 16 BRPM | SYSTOLIC BLOOD PRESSURE: 142 MMHG

## 2024-12-31 DIAGNOSIS — M54.12 CERVICAL RADICULOPATHY: Primary | ICD-10-CM

## 2024-12-31 PROCEDURE — 63600175 PHARM REV CODE 636 W HCPCS: Performed by: STUDENT IN AN ORGANIZED HEALTH CARE EDUCATION/TRAINING PROGRAM

## 2024-12-31 PROCEDURE — 62321 NJX INTERLAMINAR CRV/THRC: CPT | Performed by: STUDENT IN AN ORGANIZED HEALTH CARE EDUCATION/TRAINING PROGRAM

## 2024-12-31 PROCEDURE — 62321 NJX INTERLAMINAR CRV/THRC: CPT | Mod: ,,, | Performed by: STUDENT IN AN ORGANIZED HEALTH CARE EDUCATION/TRAINING PROGRAM

## 2024-12-31 PROCEDURE — 25500020 PHARM REV CODE 255: Performed by: STUDENT IN AN ORGANIZED HEALTH CARE EDUCATION/TRAINING PROGRAM

## 2024-12-31 RX ORDER — LIDOCAINE HYDROCHLORIDE 20 MG/ML
INJECTION, SOLUTION EPIDURAL; INFILTRATION; INTRACAUDAL; PERINEURAL
Status: DISCONTINUED | OUTPATIENT
Start: 2024-12-31 | End: 2024-12-31 | Stop reason: HOSPADM

## 2024-12-31 RX ORDER — FENTANYL CITRATE 50 UG/ML
INJECTION, SOLUTION INTRAMUSCULAR; INTRAVENOUS
Status: DISCONTINUED | OUTPATIENT
Start: 2024-12-31 | End: 2024-12-31 | Stop reason: HOSPADM

## 2024-12-31 RX ORDER — DEXAMETHASONE SODIUM PHOSPHATE 10 MG/ML
INJECTION INTRAMUSCULAR; INTRAVENOUS
Status: DISCONTINUED | OUTPATIENT
Start: 2024-12-31 | End: 2024-12-31 | Stop reason: HOSPADM

## 2024-12-31 RX ORDER — MIDAZOLAM HYDROCHLORIDE 1 MG/ML
INJECTION, SOLUTION INTRAMUSCULAR; INTRAVENOUS
Status: DISCONTINUED | OUTPATIENT
Start: 2024-12-31 | End: 2024-12-31 | Stop reason: HOSPADM

## 2024-12-31 RX ORDER — SODIUM CHLORIDE 9 MG/ML
500 INJECTION, SOLUTION INTRAVENOUS CONTINUOUS
Status: DISCONTINUED | OUTPATIENT
Start: 2025-01-01 | End: 2024-12-31 | Stop reason: HOSPADM

## 2024-12-31 NOTE — H&P
HPI  Patient presenting for Procedure(s) (LRB):  C7-T1 CHAYO (toward the R) (N/A)     Patient on Anti-coagulation No    No health changes since previous encounter    Past Medical History:   Diagnosis Date    Hypertension      Past Surgical History:   Procedure Laterality Date    EPIDURAL STEROID INJECTION INTO LUMBAR SPINE N/A 11/11/2024    Procedure: CHAYO L5-S1 (aim right);  Surgeon: Remi Mackey DO;  Location: [facility];  Service: Pain Management;  Laterality: N/A;  oral - no ac    SINUS SURGERY Bilateral 06/01/2017     Review of patient's allergies indicates:  No Known Allergies   Current Facility-Administered Medications   Medication    [START ON 1/1/2025] 0.9% NaCl infusion       PMHx, PSHx, Allergies, Medications reviewed in epic    ROS negative except pain complaints in HPI    OBJECTIVE:    /75 (BP Location: Left arm, Patient Position: Sitting)   Pulse 70   Temp 97.9 °F (36.6 °C) (Temporal)   Resp 18   SpO2 96%     PHYSICAL EXAMINATION:    GENERAL: Well appearing, in no acute distress, alert and oriented x3.  PSYCH:  Mood and affect appropriate.  SKIN: Skin color, texture, turgor normal, no rashes or lesions which will impact the procedure.  CV: RRR with palpation of the radial artery.  PULM: No evidence of respiratory difficulty, symmetric chest rise. Clear to auscultation.  NEURO: Cranial nerves grossly intact.    Plan:    Proceed with procedure as planned Procedure(s) (LRB):  C7-T1 CHAYO (toward the R) (N/A)    Remi Durand  12/31/2024

## 2024-12-31 NOTE — PLAN OF CARE
Pt in preop bay 23, VSS, IV inserted. Pt denies any open wounds on body or the use of any immunizations or antibiotics in the past 2 weeks. Pt needs site marking and consents, otherwise ready to roll.

## 2024-12-31 NOTE — PLAN OF CARE
Discharge instructions given and explained to patient and family with verbalization of understanding all instructions. Patients v/s stable, denies n/v and tolerating po, rates pain level tolerable, IV removed, and ready for patient discharge home.

## 2024-12-31 NOTE — DISCHARGE SUMMARY
[facility]  Discharge Note  Short Stay    Procedure(s) (LRB):  C7-T1 CHAYO (toward the R) (N/A)      OUTCOME: Patient tolerated treatment/procedure well without complication and is now ready for discharge.    DISPOSITION: Home or Self Care    FINAL DIAGNOSIS:  <principal problem not specified>    FOLLOWUP: In clinic    DISCHARGE INSTRUCTIONS:  No discharge procedures on file.     TIME SPENT ON DISCHARGE: 10 minutes

## 2024-12-31 NOTE — OP NOTE
"PROCEDURE:  CERVICAL C7-T1 INTERLAMINAR EPIDURAL STEROID INJECTION    Patient Name: Freddie Segura  MRN: [id number]  DATE OF PROCEDURE: 12/31/2024    DIAGNOSIS: Cervical Radiculopathy  CPT CODE: 80501      POSTPROCEDURE DIAGNOSIS: Same    PHYSICIAN: Remi Mackey DO  NEEDLE TYPE: - 20G 3.5" Touhy Needle  LOCAL ANESTHETIC INJECTED: Xylocaine 2%   MEDICATIONS INJECTED: 4cc mixture of 3cc Normal Saline + 10mg Dexamethasone (10mg/ml)  CONTRAST: Omni 300  LOSS OF RESISTANCE DEPTH: 7 cm    Sedation Medications - Mild Sedation with 2md Versed and 50mcg Fentanyl.    Conscious sedation ordered by M.D. Patient re-evaluation prior to administration of conscious sedation. No changes noted in patient's status from initial evaluation. The patient's vital signs were monitored by RN and patient remained hemodynamically stable throughout the procedure.    Event Time In   Sedation Start 0803   Sedation End 0812       Estimated Blood Loss - <2ml  Drains: None  Specimens Removed: None  Urine Output - Not Measured  Complications: None  Outcome: Good    Informed Consent:  The patient's condition and proposed procedures, risks, and alternatives were discussed with the patient or responsible party.  The patient's / responsible party's questions were answered.   The patient / responsible party appeared to understand and chose to proceed.  Informed consent was obtained.  After obtaining written consent, an IV hep lock was placed. (See nurses notes for details).     Procedure in Detail:  The patient was taken back to the OR suite and placed in a prone position. The skin overlying the injection site was prepped and draped in an aseptic fashion. The target injection site (see above) was identified with fluoroscopy and marked.     Procedural Pause:  A procedural pause verifying correct patient, medical record number, allergies, medications to be administered, current vital signs, and surgical site was performed immediately prior to " beginning the procedure.    With the patient laying in a prone position, the surgical area was prepped and draped in the usual sterile fashion using ChloraPrep and a fenestrated drape. The level was determined under fluoroscopy guidance. Skin anesthesia was achieved by injecting Lidocaine 2% over the injection site.  The interlaminar space was then approached with a 20 gauge, 3.5 inch Tuohy needle that was introduced under fluoroscopic guidance with AP, lateral and/or contralateral oblique imaging. Once the Ligamentum flavum was encountered loss of resistance to saline was used to enter the epidural space. With positive loss of resistance and negative aspiration for CSF or Blood, contrast dye  Omnipaque (300mg/mL) was injected to confirm placement and there was no vascular runoff. Then 4 mL of the medication mixture listed above was then injected slowly. Displacement of the radio opaque contrast after injection of the medication confirmed that the medication went into the area of the epidural space. The needles were removed, and bleeding was nil. A sterile dressing was applied. No specimens collected. The patient tolerated the procedure well.     The heart rate, pulse oximetry, and blood pressure were continuously monitored throughout the procedure.  The procedure was well tolerated. He was carefully escorted to the recovery room in stable condition. Patient was monitored by RN for recovery period.  The patient will be contacted in the next few days to determine extent of relief.  Patient was given post procedure and discharge instructions to follow at home.  The patient was discharged in a stable condition.    Note Electronically Signed By:  Remi Durand

## 2025-01-03 ENCOUNTER — DOCUMENTATION ONLY (OUTPATIENT)
Dept: REHABILITATION | Facility: HOSPITAL | Age: 55
End: 2025-01-03
Payer: COMMERCIAL

## 2025-01-03 NOTE — PROGRESS NOTES
PT/PTA met face to face to discuss pt's treatment plan and progress towards established goals. Pt will be seen by a physical therapist minimally every 6th visit or every 30 days.    Please see Updated Plan of Care for changes and updated goals.       Isidro Cabrera, PTA